# Patient Record
Sex: FEMALE | Race: WHITE | Employment: OTHER | URBAN - METROPOLITAN AREA
[De-identification: names, ages, dates, MRNs, and addresses within clinical notes are randomized per-mention and may not be internally consistent; named-entity substitution may affect disease eponyms.]

---

## 2017-02-02 ENCOUNTER — HOSPITAL ENCOUNTER (OUTPATIENT)
Dept: MAMMOGRAPHY | Age: 72
Discharge: HOME | End: 2017-02-02
Attending: FAMILY MEDICINE | Admitting: FAMILY MEDICINE

## 2017-02-02 DIAGNOSIS — Z13.820 SCREENING FOR OSTEOPOROSIS: ICD-10-CM

## 2017-02-02 DIAGNOSIS — Z12.31 ENCOUNTER FOR SCREENING MAMMOGRAM FOR BREAST CANCER: ICD-10-CM

## 2018-04-16 ENCOUNTER — HOSPITAL ENCOUNTER (OUTPATIENT)
Dept: MAMMOGRAPHY | Age: 73
Discharge: HOME | End: 2018-04-16
Attending: FAMILY MEDICINE | Admitting: FAMILY MEDICINE

## 2018-04-16 DIAGNOSIS — Z12.31 ENCOUNTER FOR SCREENING MAMMOGRAM FOR BREAST CANCER: ICD-10-CM

## 2019-01-10 ENCOUNTER — HOSPITAL ENCOUNTER (OUTPATIENT)
Dept: PULMONOLOGY | Age: 74
Discharge: HOME | End: 2019-01-10
Payer: MEDICARE

## 2019-01-10 ENCOUNTER — HOSPITAL ENCOUNTER (OUTPATIENT)
Dept: ULTRASOUND IMAGING | Age: 74
Discharge: HOME | End: 2019-01-10
Payer: MEDICARE

## 2019-01-10 VITALS — OXYGEN SATURATION: 96 %

## 2019-01-10 DIAGNOSIS — R10.11 ABDOMINAL PAIN, RIGHT UPPER QUADRANT: ICD-10-CM

## 2019-01-10 PROCEDURE — 94760 N-INVAS EAR/PLS OXIMETRY 1: CPT

## 2019-01-10 PROCEDURE — 94726 PLETHYSMOGRAPHY LUNG VOLUMES: CPT

## 2019-01-10 PROCEDURE — 94664 DEMO&/EVAL PT USE INHALER: CPT

## 2019-01-10 PROCEDURE — 6360000002 HC RX W HCPCS: Performed by: FAMILY MEDICINE

## 2019-01-10 PROCEDURE — 76700 US EXAM ABDOM COMPLETE: CPT

## 2019-01-10 PROCEDURE — 94060 EVALUATION OF WHEEZING: CPT

## 2019-01-10 PROCEDURE — 94729 DIFFUSING CAPACITY: CPT

## 2019-01-10 PROCEDURE — 94640 AIRWAY INHALATION TREATMENT: CPT

## 2019-01-10 RX ORDER — ALBUTEROL SULFATE 2.5 MG/3ML
2.5 SOLUTION RESPIRATORY (INHALATION) ONCE
Status: COMPLETED | OUTPATIENT
Start: 2019-01-10 | End: 2019-01-10

## 2019-01-10 RX ADMIN — ALBUTEROL SULFATE 2.5 MG: 2.5 SOLUTION RESPIRATORY (INHALATION) at 08:39

## 2019-04-19 ENCOUNTER — HOSPITAL ENCOUNTER (OUTPATIENT)
Dept: WOMENS IMAGING | Age: 74
Discharge: HOME | End: 2019-04-19
Payer: MEDICARE

## 2019-04-19 DIAGNOSIS — Z12.31 ENCOUNTER FOR SCREENING MAMMOGRAM FOR MALIGNANT NEOPLASM OF BREAST: ICD-10-CM

## 2019-04-19 PROCEDURE — 77063 BREAST TOMOSYNTHESIS BI: CPT

## 2019-09-18 ENCOUNTER — OFFICE VISIT (OUTPATIENT)
Dept: ORTHOPEDIC SURGERY | Age: 74
End: 2019-09-18
Payer: MEDICARE

## 2019-09-18 VITALS — WEIGHT: 165 LBS | HEIGHT: 63 IN | BODY MASS INDEX: 29.23 KG/M2

## 2019-09-18 DIAGNOSIS — M25.562 LEFT KNEE PAIN, UNSPECIFIED CHRONICITY: Primary | ICD-10-CM

## 2019-09-18 PROCEDURE — 99214 OFFICE O/P EST MOD 30 MIN: CPT | Performed by: ORTHOPAEDIC SURGERY

## 2019-09-19 NOTE — PROGRESS NOTES
person. The patient's mood and affect are appropriate. Lymphatic: The lymphatic examination bilaterally reveals all areas to be without enlargement or induration. Vascular: Examination reveals no swelling or calf tenderness. Peripheral pulses are palpable and 2+. Neurological: The patient has good coordination. There is no weakness or sensory deficit. Left Knee Examination  Inspection:   Knee alignment: neutral  no swelling noted. No erythema or ecchymosis. Skin is intact with no cellulitis, rashes, ulcerations, lymphedema or cutaneous lesions noted. Gait: normal. The patient can bear weight on the extremity. Palpation: mild tenderness to palpation on the patellar tendon. no effusion noted. Range of Motion:  full    Special Tests:  Lachman test: negative       Anterior drawer: negative       Posterior drawer: negative       Tammie's test: negative       Varus laxity at 30 degrees: negative       Valgus laxity at 30 degrees: negative    Strength: No gross motor weakness noted. All muscle groups appear to be functioning. Motor exam of the lower extremities show quadriceps, hamstrings, foot dorsiflexion and plantarflexion grossly intact. Neurologic & vascular: Sensation to both feet is grossly intact to light touch. The bilateral lower extremities are warm and well-perfused with brisk capillary refill. Additional Examinations:  Right Lower Extremity: Examination of the right lower extremity does not show any tenderness, deformity or injury. Range of motion is unremarkable. There is no gross instability. There are no rashes, ulcerations or lesions. Strength and tone are normal.      DIAGNOSTICS  Xrays obtained in office today:  Yes  Xrays reviewed today: Yes  Four views of the left knee   Fracture: No  Dislocation: No  Knee joint arthritis: mild  Medial compartment: mild  Lateral compartment: mild  Patellofemoral compartment: mild  Patellar tilt: No  Varus deformity: No  Valgus instructed to contact the office between now and schedule appointment if she has concerns related to her condition or if she needs assistance in scheduling any above tests. She is welcome to call for an appointment sooner if she has any additional concerns or questions. This dictation was performed with a verbal recognition program (DRAGON) and it was checked for errors. It is possible that there are still dictated errors within this office note. If so, please bring any errors to my attention for an addendum. All efforts were made to ensure that this office note is accurate.

## 2020-05-11 ENCOUNTER — HOSPITAL ENCOUNTER (OUTPATIENT)
Dept: WOMENS IMAGING | Age: 75
Discharge: HOME | End: 2020-05-11
Payer: MEDICARE

## 2020-05-11 PROCEDURE — 77063 BREAST TOMOSYNTHESIS BI: CPT

## 2021-02-21 ENCOUNTER — APPOINTMENT (OUTPATIENT)
Dept: GENERAL RADIOLOGY | Age: 76
End: 2021-02-21
Payer: MEDICARE

## 2021-02-21 ENCOUNTER — HOSPITAL ENCOUNTER (EMERGENCY)
Age: 76
Discharge: ANOTHER HOSPITAL | End: 2021-02-21
Attending: STUDENT IN AN ORGANIZED HEALTH CARE EDUCATION/TRAINING PROGRAM
Payer: MEDICARE

## 2021-02-21 ENCOUNTER — HOSPITAL ENCOUNTER (OUTPATIENT)
Age: 76
Setting detail: OBSERVATION
Discharge: HOME | End: 2021-02-22
Attending: INTERNAL MEDICINE | Admitting: INTERNAL MEDICINE
Payer: MEDICARE

## 2021-02-21 ENCOUNTER — APPOINTMENT (OUTPATIENT)
Dept: CT IMAGING | Age: 76
End: 2021-02-21
Payer: MEDICARE

## 2021-02-21 VITALS
WEIGHT: 175 LBS | DIASTOLIC BLOOD PRESSURE: 70 MMHG | RESPIRATION RATE: 19 BRPM | HEIGHT: 63 IN | SYSTOLIC BLOOD PRESSURE: 141 MMHG | OXYGEN SATURATION: 97 % | HEART RATE: 60 BPM | BODY MASS INDEX: 31.01 KG/M2 | TEMPERATURE: 98.6 F

## 2021-02-21 DIAGNOSIS — I27.20 PULMONARY HTN (HCC): ICD-10-CM

## 2021-02-21 DIAGNOSIS — R91.1 PULMONARY NODULE: ICD-10-CM

## 2021-02-21 DIAGNOSIS — E04.1 THYROID NODULE: ICD-10-CM

## 2021-02-21 DIAGNOSIS — H53.2 DIPLOPIA: Primary | ICD-10-CM

## 2021-02-21 LAB
A/G RATIO: 1.4 (ref 1.1–2.2)
ALBUMIN SERPL-MCNC: 4.5 G/DL (ref 3.4–5)
ALP BLD-CCNC: 118 U/L (ref 40–129)
ALT SERPL-CCNC: 34 U/L (ref 10–40)
ANION GAP SERPL CALCULATED.3IONS-SCNC: 8 MMOL/L (ref 3–16)
AST SERPL-CCNC: 27 U/L (ref 15–37)
BASOPHILS ABSOLUTE: 0.1 K/UL (ref 0–0.2)
BASOPHILS RELATIVE PERCENT: 0.5 %
BILIRUB SERPL-MCNC: 0.4 MG/DL (ref 0–1)
BUN BLDV-MCNC: 21 MG/DL (ref 7–20)
CALCIUM SERPL-MCNC: 10 MG/DL (ref 8.3–10.6)
CHLORIDE BLD-SCNC: 101 MMOL/L (ref 99–110)
CHP ED QC CHECK: NORMAL
CO2: 31 MMOL/L (ref 21–32)
CREAT SERPL-MCNC: 1 MG/DL (ref 0.6–1.2)
EOSINOPHILS ABSOLUTE: 0.3 K/UL (ref 0–0.6)
EOSINOPHILS RELATIVE PERCENT: 2.6 %
GFR AFRICAN AMERICAN: >60
GFR NON-AFRICAN AMERICAN: 54
GLOBULIN: 3.2 G/DL
GLUCOSE BLD-MCNC: 81 MG/DL (ref 70–99)
GLUCOSE BLD-MCNC: 86 MG/DL
GLUCOSE BLD-MCNC: 86 MG/DL (ref 70–99)
HCT VFR BLD CALC: 40.6 % (ref 36–48)
HEMOGLOBIN: 14.1 G/DL (ref 12–16)
INR BLD: 1.03 (ref 0.86–1.14)
LYMPHOCYTES ABSOLUTE: 3.2 K/UL (ref 1–5.1)
LYMPHOCYTES RELATIVE PERCENT: 32.6 %
MCH RBC QN AUTO: 29.6 PG (ref 26–34)
MCHC RBC AUTO-ENTMCNC: 34.7 G/DL (ref 31–36)
MCV RBC AUTO: 85.4 FL (ref 80–100)
MONOCYTES ABSOLUTE: 1 K/UL (ref 0–1.3)
MONOCYTES RELATIVE PERCENT: 10.7 %
NEUTROPHILS ABSOLUTE: 5.2 K/UL (ref 1.7–7.7)
NEUTROPHILS RELATIVE PERCENT: 53.6 %
PDW BLD-RTO: 12.9 % (ref 12.4–15.4)
PERFORMED ON: NORMAL
PLATELET # BLD: 213 K/UL (ref 135–450)
PMV BLD AUTO: 8.4 FL (ref 5–10.5)
POTASSIUM REFLEX MAGNESIUM: 3.7 MMOL/L (ref 3.5–5.1)
PROTHROMBIN TIME: 11.9 SEC (ref 10–13.2)
RBC # BLD: 4.76 M/UL (ref 4–5.2)
SODIUM BLD-SCNC: 140 MMOL/L (ref 136–145)
TOTAL PROTEIN: 7.7 G/DL (ref 6.4–8.2)
TROPONIN: <0.01 NG/ML
WBC # BLD: 9.7 K/UL (ref 4–11)

## 2021-02-21 PROCEDURE — 6370000000 HC RX 637 (ALT 250 FOR IP): Performed by: STUDENT IN AN ORGANIZED HEALTH CARE EDUCATION/TRAINING PROGRAM

## 2021-02-21 PROCEDURE — 70450 CT HEAD/BRAIN W/O DYE: CPT

## 2021-02-21 PROCEDURE — 71045 X-RAY EXAM CHEST 1 VIEW: CPT

## 2021-02-21 PROCEDURE — 99284 EMERGENCY DEPT VISIT MOD MDM: CPT

## 2021-02-21 PROCEDURE — G0379 DIRECT REFER HOSPITAL OBSERV: HCPCS

## 2021-02-21 PROCEDURE — 80053 COMPREHEN METABOLIC PANEL: CPT

## 2021-02-21 PROCEDURE — 99285 EMERGENCY DEPT VISIT HI MDM: CPT

## 2021-02-21 PROCEDURE — 85610 PROTHROMBIN TIME: CPT

## 2021-02-21 PROCEDURE — 93005 ELECTROCARDIOGRAM TRACING: CPT | Performed by: STUDENT IN AN ORGANIZED HEALTH CARE EDUCATION/TRAINING PROGRAM

## 2021-02-21 PROCEDURE — 70496 CT ANGIOGRAPHY HEAD: CPT

## 2021-02-21 PROCEDURE — 85025 COMPLETE CBC W/AUTO DIFF WBC: CPT

## 2021-02-21 PROCEDURE — G0378 HOSPITAL OBSERVATION PER HR: HCPCS

## 2021-02-21 PROCEDURE — 6360000004 HC RX CONTRAST MEDICATION: Performed by: STUDENT IN AN ORGANIZED HEALTH CARE EDUCATION/TRAINING PROGRAM

## 2021-02-21 PROCEDURE — 84484 ASSAY OF TROPONIN QUANT: CPT

## 2021-02-21 RX ORDER — ASPIRIN 81 MG/1
81 TABLET ORAL DAILY
Status: DISCONTINUED | OUTPATIENT
Start: 2021-02-22 | End: 2021-02-22 | Stop reason: HOSPADM

## 2021-02-21 RX ORDER — ACETAMINOPHEN 325 MG/1
650 TABLET ORAL EVERY 4 HOURS PRN
Status: DISCONTINUED | OUTPATIENT
Start: 2021-02-21 | End: 2021-02-22 | Stop reason: HOSPADM

## 2021-02-21 RX ORDER — ATORVASTATIN CALCIUM 80 MG/1
80 TABLET, FILM COATED ORAL NIGHTLY
Status: DISCONTINUED | OUTPATIENT
Start: 2021-02-22 | End: 2021-02-22 | Stop reason: HOSPADM

## 2021-02-21 RX ORDER — ASPIRIN 300 MG/1
300 SUPPOSITORY RECTAL DAILY
Status: DISCONTINUED | OUTPATIENT
Start: 2021-02-22 | End: 2021-02-22 | Stop reason: HOSPADM

## 2021-02-21 RX ORDER — PANTOPRAZOLE SODIUM 40 MG/1
40 TABLET, DELAYED RELEASE ORAL
Status: DISCONTINUED | OUTPATIENT
Start: 2021-02-22 | End: 2021-02-22 | Stop reason: HOSPADM

## 2021-02-21 RX ORDER — SODIUM CHLORIDE 0.9 % (FLUSH) 0.9 %
10 SYRINGE (ML) INJECTION EVERY 12 HOURS SCHEDULED
Status: DISCONTINUED | OUTPATIENT
Start: 2021-02-22 | End: 2021-02-22 | Stop reason: HOSPADM

## 2021-02-21 RX ORDER — M-VIT,TX,IRON,MINS/CALC/FOLIC 27MG-0.4MG
1 TABLET ORAL DAILY
Status: DISCONTINUED | OUTPATIENT
Start: 2021-02-22 | End: 2021-02-22 | Stop reason: HOSPADM

## 2021-02-21 RX ORDER — SODIUM CHLORIDE 0.9 % (FLUSH) 0.9 %
10 SYRINGE (ML) INJECTION PRN
Status: DISCONTINUED | OUTPATIENT
Start: 2021-02-21 | End: 2021-02-22 | Stop reason: HOSPADM

## 2021-02-21 RX ORDER — PROMETHAZINE HYDROCHLORIDE 25 MG/1
12.5 TABLET ORAL EVERY 6 HOURS PRN
Status: DISCONTINUED | OUTPATIENT
Start: 2021-02-21 | End: 2021-02-22 | Stop reason: HOSPADM

## 2021-02-21 RX ORDER — ONDANSETRON 2 MG/ML
4 INJECTION INTRAMUSCULAR; INTRAVENOUS EVERY 6 HOURS PRN
Status: DISCONTINUED | OUTPATIENT
Start: 2021-02-21 | End: 2021-02-22 | Stop reason: HOSPADM

## 2021-02-21 RX ORDER — ASPIRIN 325 MG
325 TABLET ORAL ONCE
Status: COMPLETED | OUTPATIENT
Start: 2021-02-21 | End: 2021-02-21

## 2021-02-21 RX ORDER — LABETALOL HYDROCHLORIDE 5 MG/ML
10 INJECTION, SOLUTION INTRAVENOUS EVERY 10 MIN PRN
Status: DISCONTINUED | OUTPATIENT
Start: 2021-02-21 | End: 2021-02-22 | Stop reason: HOSPADM

## 2021-02-21 RX ADMIN — ASPIRIN 325 MG: 325 TABLET, FILM COATED ORAL at 20:05

## 2021-02-21 RX ADMIN — IOPAMIDOL 85 ML: 755 INJECTION, SOLUTION INTRAVENOUS at 17:18

## 2021-02-21 ASSESSMENT — VISUAL ACUITY: OD: 20/70

## 2021-02-21 NOTE — ED PROVIDER NOTES
[address] ED      CHIEF COMPLAINT  Diplopia (Double vision x3 days c/o headache)       HISTORY OF PRESENT ILLNESS  Trupti Barrios is a 66 y.o. female with a past medical history of hyperlipidemia and hypertension who presents to the ED complaining of neurologic deficits. A stoke code was called. Last known well: 11 PM yesterday patient states that she had symptoms intermittent for the past couple days constant since awakening this morning  Neurologic Deficits: Double vision    Patient reports diplopia over the past 3 days. Initially was intermittent but has been constant since awakening this morning. Last known well 11 PM yesterday. She reports that the double vision is only present when she is looking at objects far away. Patient talk to her ophthalmologist today who requested that she look at her pupil sizes and she states her pupils were largely unequal with the right greater than the left. Patient was sent in by ophthalmologist for stroke evaluation. Denies any numbness, weakness, tingling, slurred speech. She denies any head trauma or blood thinners. She denies chest pain or shortness of breath or other symptoms. Patient does not smoke. No other complaints, modifying factors or associated symptoms. I have reviewed the following from the nursing documentation.     Past Medical History:   Diagnosis Date    GERD (gastroesophageal reflux disease)     Hyperlipidemia     in past    Hypertension      Past Surgical History:   Procedure Laterality Date    ABDOMINOPLASTY      BLADDER REPAIR  1991    COLONOSCOPY  4889/1533    x2 polyps    HYSTERECTOMY  1984     Family History   Problem Relation Age of Onset    Asthma Mother     Cancer Mother         leukemia     Social History     Socioeconomic History    Marital status:      Spouse name: Not on file    Number of children: Not on file    Years of education: Not on file    Highest education level: Not on file Occupational History    Not on file   Social Needs    Financial resource strain: Not on file    Food insecurity     Worry: Not on file     Inability: Not on file    Transportation needs     Medical: Not on file     Non-medical: Not on file   Tobacco Use    Smoking status: Never Smoker    Smokeless tobacco: Never Used   Substance and Sexual Activity    Alcohol use: No    Drug use: No    Sexual activity: Not on file   Lifestyle    Physical activity     Days per week: Not on file     Minutes per session: Not on file    Stress: Not on file   Relationships    Social connections     Talks on phone: Not on file     Gets together: Not on file     Attends Buddhism service: Not on file     Active member of club or organization: Not on file     Attends meetings of clubs or organizations: Not on file     Relationship status: Not on file    Intimate partner violence     Fear of current or ex partner: Not on file     Emotionally abused: Not on file     Physically abused: Not on file     Forced sexual activity: Not on file   Other Topics Concern    Not on file   Social History Narrative    Not on file     No current facility-administered medications for this encounter. No current outpatient medications on file. No Known Allergies    REVIEW OF SYSTEMS  10 systems reviewed, pertinent positives per HPI otherwise noted to be negative. PHYSICAL EXAM  BP (!) 155/87   Pulse 83   Temp 98.6 °F (37 °C) (Oral)   Resp 20   Ht 5' 3\" (1.6 m)   Wt 175 lb (79.4 kg)   SpO2 97%   BMI 31.00 kg/m²    GENERAL APPEARANCE: Awake and alert. Cooperative. no distress. HENT: Normocephalic. Atraumatic. Mucous membranes are moist  NECK: Supple. Full range of motion of the neck without stiffness or pain. EYES: PERRL. EOM's intact. No evidence of foreign body. Fluorescein staining shows no abrasions or ulceration. Ocular pressure is: right- 18; left- 27. Visual acuity is: right- 20/70; left- 20/40.   Together-20/40 No evidence of periorbital cellulitis. No evidence of proptosis. HEART/CHEST: RRR. No murmurs. LUNGS: Respirations unlabored. CTAB. Good air exchange. Speaking comfortably in full sentences. ABDOMEN: No tenderness. Soft. Non-distended. No masses. No organomegaly. No guarding or rebound. MUSCULOSKELETAL: No extremity edema. Compartments soft. No deformity. No tenderness in the extremities. All extremities neurovascularly intact. SKIN: Warm and dry. No acute rashes. PSYCHIATRIC: Normal mood and affect. NEUROLOGICAL: Alert and oriented. CN's 2-12 intact. No gross facial drooping. Strength 5/5, sensation intact. NIHSS: 0      NIH Stroke Scale     Interval: Baseline  Time: 4:47 PM  Person Administering Scale: Zain Smith   Administer stroke scale items in the order listed. Record performance in each category after each subscale exam. Do not go back and change scores. Follow directions provided for each exam technique. Scores should reflect what the patient does, not what the clinician thinks the patient can do. The clinician should record answers while administering the exam and work quickly. Except where indicated, the patient should not be coached (i.e., repeated requests to patient to make a special effort). 1a  Level of consciousness: 0=alert; keenly responsive   1b. LOC questions:  0=Performs both tasks correctly   1c. LOC commands: 0=Performs both tasks correctly   2. Best Gaze: 0=normal   3. Visual: 0=No visual loss   4. Facial Palsy: 0=Normal symmetric movement   5a. Motor left arm: 0=No drift, limb holds 90 (or 45) degrees for full 10 seconds   5b. Motor right arm: 0=No drift, limb holds 90 (or 45) degrees for full 10 seconds   6a. motor left le=No drift, limb holds 90 (or 45) degrees for full 10 seconds   6b  Motor right le=No drift, limb holds 90 (or 45) degrees for full 10 seconds   7. Limb Ataxia: 0=Absent   8. Sensory: 0=Normal; no sensory loss   9.  Best Language:  0=No aphasia, Ref Range    Ventricular Rate 74 BPM    Atrial Rate 74 BPM    P-R Interval 144 ms    QRS Duration 70 ms    Q-T Interval 408 ms    QTc Calculation (Bazett) 452 ms    P Axis 5 degrees    R Axis -6 degrees    T Axis 30 degrees    Diagnosis       Normal sinus rhythmMinimal voltage criteria for LVH, may be normal variantBorderline ECGWhen compared with ECG of 20-JAN-2009 08:21,No significant change was found       ECG  The Ekg interpreted by me shows  normal sinus rhythm with a rate of 74  Axis is   Left axis deviation  QTc is  prolonged  Intervals and Durations are unremarkable. ST Segments: nonspecific changes  No previous for comparison. RADIOLOGY    XR CHEST PORTABLE   Final Result   Borderline cardiomegaly, without evidence of CHF. Changes are exaggerated by   the poor inspiratory effort. CTA HEAD NECK W CONTRAST   Final Result   1. No acute intracranial abnormality. 2. No acute abnormality or flow-limiting stenosis of the major arteries of   the head and neck. 3. Pulmonary hypertension. 4. 2 cm right thyroid nodule. Follow-up recommendation is listed below. 5. 4 mm lung nodule in the right upper lobe. Follow-up recommendation is   listed below. Results reported to Dr. Gianna Kingston at 5:20 p.m. on February 21, 2021. RECOMMENDATIONS:   Managing Incidental Thyroid Nodule Detected at CT or MRI or US1. Further evaluation by thyroid Ultrasound recommended for these incidental   nodules:      ~Age 18 years or less - Any nodule. ~Age 21-27 years old - Nodule 1 cm in size or greater. ~Age 35 years or more - Nodule 1.5 cm in size or greater. Follow up thyroid ultrasound also recommend in these scenarios:      ~Solitary nodule with high risk imaging features (locally invasive nodule   or suspicious lymph nodes). ~Any nodule in a heterogeneous enlarged thyroid gland.       NO further imaging is recommended in the following scenarios:      ~No f/u imaging is recommended for ITNs scenarios:      ~Solitary nodule with high risk imaging features (locally invasive nodule   or suspicious lymph nodes). ~Any nodule in a heterogeneous enlarged thyroid gland. NO further imaging is recommended in the following scenarios:      ~No f/u imaging is recommended for ITNs not meeting the above criteria.      ~No US or f/u recommended for ITNs without high risk features or with   limited life expectancy or significant co-morbidities, unless clinically   warranted. Note: These recommendations do not apply if increased risk for thyroid cancer   or symptomatic thyroid disease. Recommendations for f/u of Incidental Thyroid Nodules (ITN) found on CT, MR,   NM and Extrathyroidal US are based upon the ACR white paper and [facility] 3-tiered   system for managing ITNs:J Am Domenico Radiol. 2015 Feb;12(2): 143-50      Fleischner Society guidelines for follow-up and management of incidentally   detected pulmonary nodules:      Single Solid Nodule:      Nodule size less than 6 mm   In a low-risk patient, no routine follow-up. In a high-risk patient, optional CT at 12 months. - Low risk patients include individuals with minimal or absent history of   smoking and other known risk factors. - High risk patients include individuals with a history or smoking or known   risk factors. Radiology 2017 http://pubs. rsna.org/doi/full/10.1148/radiol. 7750286585                  ED COURSE / MDM  Patient seen and evaluated. Old records reviewed. Labs and imaging reviewed and results discussed with patient. Overall, well appearing patient in no acute distress, presenting for diplopia. Physical exam remarkable for reported diplopia, ocular pressure elevated in left eye.      Differential diagnosis includes but is not limited to: Subarachnoid hemorrhage, intracranial hemorrhage, CVA, TIA, malignancy, hypoglycemia, low suspicion for glaucoma    During the patient's ED course, the patient was given: Medications   iopamidol (ISOVUE-370) 76 % injection 85 mL (85 mLs Intravenous Given 2/21/21 1718)   aspirin tablet 325 mg (325 mg Oral Given 2/21/21 2005)        ED Course as of Feb 22 1423   Sun Feb 21, 2021   Spoke to [employer] stroke team given that last known well was 11 PM. They agreed the patient is not a candidate for TPA. They did agree with stroke work-up. [ER]   No hypoglycemia    [ER]   No leukocytosis, anemia, thrombocytopenia. [ER]   Mildly elevated BUN, no other significant kidney dysfunction or electrolyte abnormality. [ER]   Liver function testing unremarkable. [ER]   Coagulation studies unremarkable. [ER]   CT scan showed no evidence of intracranial hemorrhage or large vessel occlusion.    [ER]      ED Course User Index  [ER] Catalina Soto MD     Patient's ocular exam was remarkable for elevated ocular pressure in the left eye. Patient denies any pain. Lower suspicion for acute glaucoma given lack of pain. Suspect that abnormal ocular pressures due to patient intolerance of Cliff-Pen measurements. Patient also had some discontinuity of visual acuity between her eyes with the right worse than the left. I did discuss patient's ocular exam with patient's ophthalmologist including abnormal visual acuity as well as increased left ocular pressure. Dr. Julia Bradley did not believe that the patient needed to be seen by an ophthalmologist urgently and stated that patient could have stroke work-up completed and then follow-up at the [facility] on an outpatient basis. Patient's NIH stroke scale was 0. A stroke alert was called due to last known well 11 PM yesterday. Patient was not a candidate for TPA. CT imaging did not show acute bleeding or LVO. Patient was discussed with [facility] stroke team.  They did agree with plan to admit for MRI. Given aspirin.     She had multiple incidental findings on imaging including pulmonary nodule and thyroid nodule. Patient alerted to these findings and encouraged to follow-up on an outpatient basis for further testing as needed. She does not have a history of smoking does have a history of secondhand smoke exposure. CT scan also showed evidence of possible pulmonary hypertension, recommend further evaluation while inpatient. She was transferred to [facility] in stable condition. CLINICAL IMPRESSION  1. Diplopia    2. Pulmonary HTN (HCC)    3. Pulmonary nodule    4. Thyroid nodule        Blood pressure (!) 141/70, pulse 60, temperature 98.6 °F (37 °C), temperature source Oral, resp. rate 19, height 5' 3\" (1.6 m), weight 175 lb (79.4 kg), SpO2 97 %. DISPOSITION  [place] was transferred to [facility] in stable condition. DISCLAIMER: This chart was created using Dragon dictation software. Efforts were made by me to ensure accuracy, however some errors may be present due to limitations of this technology and occasionally words are not transcribed correctly.       Karen Alvarado MD  02/22/21 6349

## 2021-02-21 NOTE — ED NOTES
1841 - called [facility] in hopes to speak with Dr. Ashley Loaiza.    1844 - Dr. Ashley Loaiza called back with [facility]     Jaden Jaramillo  02/21/21 1845    Correction on name Dr. Loco Chavez  02/21/21 [phone]

## 2021-02-21 NOTE — ED NOTES
80 - Called   for stroke team  80 - Stroke phone called for CT Scan  1704 - Stroke team doctor called back.  (Dr. Srikanth Aldrich)      Donovan Miller  02/21/21 1710

## 2021-02-22 ENCOUNTER — APPOINTMENT (OUTPATIENT)
Dept: MRI IMAGING | Age: 76
End: 2021-02-22
Attending: INTERNAL MEDICINE
Payer: MEDICARE

## 2021-02-22 VITALS
SYSTOLIC BLOOD PRESSURE: 132 MMHG | DIASTOLIC BLOOD PRESSURE: 72 MMHG | HEART RATE: 57 BPM | HEIGHT: 63 IN | RESPIRATION RATE: 16 BRPM | TEMPERATURE: 98.1 F | WEIGHT: 175 LBS | BODY MASS INDEX: 31.01 KG/M2

## 2021-02-22 LAB
ANION GAP SERPL CALCULATED.3IONS-SCNC: 8 MMOL/L (ref 3–16)
BASOPHILS ABSOLUTE: 0.1 K/UL (ref 0–0.2)
BASOPHILS RELATIVE PERCENT: 0.8 %
BUN BLDV-MCNC: 23 MG/DL (ref 7–20)
CALCIUM SERPL-MCNC: 9.6 MG/DL (ref 8.3–10.6)
CHLORIDE BLD-SCNC: 99 MMOL/L (ref 99–110)
CHOLESTEROL, TOTAL: 128 MG/DL (ref 0–199)
CO2: 29 MMOL/L (ref 21–32)
CREAT SERPL-MCNC: 0.9 MG/DL (ref 0.6–1.2)
EKG ATRIAL RATE: 74 BPM
EKG DIAGNOSIS: NORMAL
EKG P AXIS: 5 DEGREES
EKG P-R INTERVAL: 144 MS
EKG Q-T INTERVAL: 408 MS
EKG QRS DURATION: 70 MS
EKG QTC CALCULATION (BAZETT): 452 MS
EKG R AXIS: -6 DEGREES
EKG T AXIS: 30 DEGREES
EKG VENTRICULAR RATE: 74 BPM
EOSINOPHILS ABSOLUTE: 0.2 K/UL (ref 0–0.6)
EOSINOPHILS RELATIVE PERCENT: 2.8 %
ESTIMATED AVERAGE GLUCOSE: 116.9 MG/DL
GFR AFRICAN AMERICAN: >60
GFR NON-AFRICAN AMERICAN: >60
GLUCOSE BLD-MCNC: 135 MG/DL (ref 70–99)
HBA1C MFR BLD: 5.7 %
HCT VFR BLD CALC: 37.4 % (ref 36–48)
HDLC SERPL-MCNC: 49 MG/DL (ref 40–60)
HEMOGLOBIN: 12.9 G/DL (ref 12–16)
LDL CHOLESTEROL CALCULATED: 67 MG/DL
LYMPHOCYTES ABSOLUTE: 2.7 K/UL (ref 1–5.1)
LYMPHOCYTES RELATIVE PERCENT: 32.5 %
MCH RBC QN AUTO: 29.5 PG (ref 26–34)
MCHC RBC AUTO-ENTMCNC: 34.6 G/DL (ref 31–36)
MCV RBC AUTO: 85.4 FL (ref 80–100)
MONOCYTES ABSOLUTE: 0.7 K/UL (ref 0–1.3)
MONOCYTES RELATIVE PERCENT: 8.5 %
NEUTROPHILS ABSOLUTE: 4.6 K/UL (ref 1.7–7.7)
NEUTROPHILS RELATIVE PERCENT: 55.4 %
PDW BLD-RTO: 12.9 % (ref 12.4–15.4)
PLATELET # BLD: 186 K/UL (ref 135–450)
PMV BLD AUTO: 8.4 FL (ref 5–10.5)
POTASSIUM REFLEX MAGNESIUM: 3.9 MMOL/L (ref 3.5–5.1)
RBC # BLD: 4.38 M/UL (ref 4–5.2)
SODIUM BLD-SCNC: 136 MMOL/L (ref 136–145)
TRIGL SERPL-MCNC: 59 MG/DL (ref 0–150)
TROPONIN: <0.01 NG/ML
TROPONIN: <0.01 NG/ML
TSH REFLEX: 0.84 UIU/ML (ref 0.27–4.2)
VLDLC SERPL CALC-MCNC: 12 MG/DL
WBC # BLD: 8.4 K/UL (ref 4–11)

## 2021-02-22 PROCEDURE — 84484 ASSAY OF TROPONIN QUANT: CPT

## 2021-02-22 PROCEDURE — 84443 ASSAY THYROID STIM HORMONE: CPT

## 2021-02-22 PROCEDURE — 96372 THER/PROPH/DIAG INJ SC/IM: CPT

## 2021-02-22 PROCEDURE — G0378 HOSPITAL OBSERVATION PER HR: HCPCS

## 2021-02-22 PROCEDURE — 93010 ELECTROCARDIOGRAM REPORT: CPT | Performed by: INTERNAL MEDICINE

## 2021-02-22 PROCEDURE — 2580000003 HC RX 258: Performed by: NURSE PRACTITIONER

## 2021-02-22 PROCEDURE — 83036 HEMOGLOBIN GLYCOSYLATED A1C: CPT

## 2021-02-22 PROCEDURE — 85025 COMPLETE CBC W/AUTO DIFF WBC: CPT

## 2021-02-22 PROCEDURE — 6370000000 HC RX 637 (ALT 250 FOR IP): Performed by: NURSE PRACTITIONER

## 2021-02-22 PROCEDURE — 99219 PR INITIAL OBSERVATION CARE/DAY 50 MINUTES: CPT | Performed by: PSYCHIATRY & NEUROLOGY

## 2021-02-22 PROCEDURE — 70551 MRI BRAIN STEM W/O DYE: CPT

## 2021-02-22 PROCEDURE — 80061 LIPID PANEL: CPT

## 2021-02-22 PROCEDURE — 6360000002 HC RX W HCPCS: Performed by: NURSE PRACTITIONER

## 2021-02-22 PROCEDURE — 80048 BASIC METABOLIC PNL TOTAL CA: CPT

## 2021-02-22 RX ORDER — ATORVASTATIN CALCIUM 40 MG/1
40 TABLET, FILM COATED ORAL NIGHTLY
Qty: 30 TABLET | Refills: 1 | Status: SHIPPED | OUTPATIENT
Start: 2021-02-22

## 2021-02-22 RX ORDER — ASPIRIN 81 MG/1
81 TABLET ORAL DAILY
Qty: 30 TABLET | Refills: 3 | Status: SHIPPED | OUTPATIENT
Start: 2021-02-23

## 2021-02-22 RX ADMIN — SODIUM CHLORIDE, PRESERVATIVE FREE 10 ML: 5 INJECTION INTRAVENOUS at 09:06

## 2021-02-22 RX ADMIN — ATORVASTATIN CALCIUM 80 MG: 80 TABLET, FILM COATED ORAL at 00:48

## 2021-02-22 RX ADMIN — ENOXAPARIN SODIUM 40 MG: 40 INJECTION SUBCUTANEOUS at 09:06

## 2021-02-22 RX ADMIN — PANTOPRAZOLE SODIUM 40 MG: 40 TABLET, DELAYED RELEASE ORAL at 05:51

## 2021-02-22 RX ADMIN — ASPIRIN 81 MG: 81 TABLET, COATED ORAL at 09:06

## 2021-02-22 RX ADMIN — Medication 1 TABLET: at 09:06

## 2021-02-22 ASSESSMENT — PAIN SCALES - GENERAL: PAINLEVEL_OUTOF10: 0

## 2021-02-22 NOTE — CARE COORDINATION
CM notes pt admitted in Observation status with Diplopia diagnosis. Met with pt at bedside. Pt independent in ADL's and drives. Plans to return back home. Lives in a ranch house one step entrance with spouse. Has PCP and insurance, no DCP needs identified at this time. Confirmed with Mian Capone RN pt is independent in her room. If any needs or concerns should arise please advise.  Raissa Hoang RN

## 2021-02-22 NOTE — H&P
Hospital Medicine History & Physical      PCP: Charlie Liu MD    Date of Admission: 2/21/2021    Date of Service: Pt seen/examined on 2/21/2021 and Admitted to observation with expected LOS less than two midnights due to medical therapy. Chief Complaint: Double vision    History Of Present Illness:      66 y.o. female, with PMH of HTN, HLD, GERD, and obesity, who was a direct admit to [facility] from [facility] ED with double vision. History was obtained from the patient and review of the EMR. The patient states that over the past 3 to 4 days, she has been experiencing significant double vision. States that it first started a few days ago while she was watching TV when she noted that many things on the television were either double or triple the normal.  She stated that this did resolve on its own. However, over the next couple of days this happened again multiple times. Earlier today, her  was driving her home after [employer] when she kept telling him that he was driving in the median though he repeatedly stated that he was in his own mary. She went home and decided to call her ophthalmologist for further recommendations. She has been following with an ophthalmologist at [facility] to be evaluated for glaucoma every 4 months or so for some time now. She noted that her left pupil looked very irregular and her right pupil was a slit almost like a cat eye. She was told to go into the ED for further evaluation by her ophthalmologist for possible stroke work-up. The patient states that this diplopia does not exist if she closes 1 eye or the other. Only happens when she has both eyes open. She denies any other deficits. No headache. No weakness. No numbness or tingling anywhere. No gait abnormalities. Patient had CT head which was unremarkable. CTA head and neck did not reveal any acute abnormality.   However it did show incidental finding of 2 cm right thyroid nodule as well as incidental finding of 4 mm lung nodule in the right upper lobe. I did discuss the results with the patient and advised her to follow-up with her PCP on this. She verbalized understanding. Past Medical History:          Diagnosis Date    GERD (gastroesophageal reflux disease)     Hyperlipidemia     in past    Hypertension        Past Surgical History:          Procedure Laterality Date    ABDOMINOPLASTY      BLADDER REPAIR  1991    COLONOSCOPY  8096/2314    x2 polyps    HYSTERECTOMY  1984       Medications Prior to Admission:      Prior to Admission medications    Medication Sig Start Date End Date Taking? Authorizing Provider   omeprazole (PRILOSEC) 20 MG capsule Take 20 mg by mouth daily. Yes Historical Provider, MD   amlodipine (NORVASC) 5 MG tablet Take 10 mg by mouth daily. Yes Historical Provider, MD   therapeutic multivitamin-minerals (THERAGRAN-M) tablet Take 1 tablet by mouth daily. Yes Historical Provider, MD       Allergies:  Patient has no known allergies. Social History:      The patient currently lives independently. TOBACCO:   reports that she has never smoked. She has never used smokeless tobacco.  ETOH:   reports no history of alcohol use. Family History:      Reviewed in detail. Positive as follows:        Problem Relation Age of Onset    Asthma Mother     Cancer Mother         leukemia       REVIEW OF SYSTEMS:   Pertinent positives as noted in the HPI. All other systems reviewed and negative. PHYSICAL EXAM PERFORMED:    BP (!) 156/87   Pulse 70   Temp 98.7 °F (37.1 °C) (Oral)   Resp 16   Ht 5' 3\" (1.6 m)   Wt 175 lb (79.4 kg)   BMI 31.00 kg/m²     General appearance: Pleasant female in no apparent distress, appears stated age and cooperative. HEENT:  Normal cephalic, atraumatic without obvious deformity. Pupils equal, round, and reactive to light. Extra ocular muscles intact. Conjunctivae/corneas clear. Neck: Supple, with full range of motion.  No jugular venous distention. Trachea midline. Respiratory:  Normal respiratory effort. Clear to auscultation, bilaterally without Rales/Wheezes/Rhonchi. Cardiovascular:  Regular rate and rhythm with normal S1/S2 without murmurs, rubs or gallops. Abdomen: Soft, non-tender, non-distended with normal bowel sounds. Musculoskeletal:  No clubbing, cyanosis or edema bilaterally. Full range of motion without deformity. Skin: Skin color, texture, turgor normal.  No rashes or lesions. Neurologic:  Neurovascularly intact without any focal sensory/motor deficits. Cranial nerves: II-XII intact, grossly non-focal.  Psychiatric:  Alert and oriented, thought content appropriate, normal insight  Capillary Refill: Brisk,< 3 seconds   Peripheral Pulses: +2 palpable, equal bilaterally       Labs:     Recent Labs     02/21/21  1648 02/22/21  0320   WBC 9.7 8.4   HGB 14.1 12.9   HCT 40.6 37.4    186     Recent Labs     02/21/21  1648 02/22/21  0320    136   K 3.7 3.9    99   CO2 31 29   BUN 21* 23*   CREATININE 1.0 0.9   CALCIUM 10.0 9.6     Recent Labs     02/21/21  1648   AST 27   ALT 34   BILITOT 0.4   ALKPHOS 118     Recent Labs     02/21/21  1648   INR 1.03     Recent Labs     02/21/21  1648 02/22/21  0019 02/22/21  0320   TROPONINI <0.01 <0.01 <0.01       Urinalysis:    No results found for: Dearl [place], BACTERIA, RBCUA, BLOODU, [address], [address]    Radiology:     CXR: I have reviewed the CXR with the following interpretation: No acute cardiopulmonary findings, borderline cardiomegaly. EKG:  I have reviewed the EKG with the following interpretation: NSR, rate of 74    MRI brain without contrast    (Results Pending)       ASSESSMENT:    Active Hospital Problems    Diagnosis Date Noted    Diplopia [H53.2] 02/21/2021         PLAN:    Diplopia, unclear etiology. TIA vs CVA rule out  - [facility] in ED non-acute. Received 324 mg ASA  - CTA head/neck unremarkable  - Continue home Plavix, ASA and atorvastatin.   - Allow for permissive HTN in the setting of possible CVA. Hold home norvasc. PRN labetalol for SBP > 220.  - MRI brain without contrast pending  - Monitor on tele. - Consult neurology for further recs - appreciated in advance.  - PT/OT not ordered as pt has no deficits    Essential HTN, controlled. - On norvasc - held for permissive htn  - Telemetry monitoring    GERD - w/out active signs/sxs of dysphagia/odynophagia. No evidence of active PUD or hx of GI bleed. Controlled on home PPI - continue    Obesity -  With Body mass index is 31 kg/m². Complicating assessment and treatment. Placing patient at risk for multiple co-morbidities as well as early death and contributing to the patient's presentation. Counseled on weight loss    Incidental finding of 2 cm right thyroid nodule. Patient verbalizes significant history of thyroid issues in her family.  - Recommended outpatient thyroid ultrasound and follow-up with her PCP for this    Incidental finding of 4 mm lung nodule in the right upper lobe. Patient denies smoking, but states that her  smokes regularly and she is around him most of the time.  - Recommended following up with her PCP        DVT Prophylaxis: lovenox  Diet: Diet NPO Effective Now  Code Status: Full Code    PT/OT Eval Status: not ordered    Dispo - pending mri       Elizabeth Moreno - NP    Thank you Jomar Rodriguez MD for the opportunity to be involved in this patient's care.  If you have any questions or concerns please feel free to contact me at [phone].  -------------------------Dr. Candi Quesada------------------------

## 2021-02-22 NOTE — ED NOTES
Report given to transport [facility]. Report called to [address] [facility] at [facility].       Marianela Lovell RN  02/21/21 1054

## 2021-02-22 NOTE — ED NOTES
2028 Patient Bed Assignment           # [id number]           Report# [id number]           [employer] [facility]TA 2130     [employer]  02/21/21 2028       [employer]  02/21/21 2032

## 2021-02-22 NOTE — PROGRESS NOTES
Admitted to room 550 via stretcher from [facility]. Alert and oriented, states still has double vision at times. Denies any pain or nausea. Call light in reach and able to demonstrate use.
intact, grossly non-focal.  Psychiatric: Alert and oriented, thought content appropriate, normal insight  Capillary Refill: Brisk,< 3 seconds   Peripheral Pulses: +2 palpable, equal bilaterally       Labs:   Recent Labs     02/21/21  1648 02/22/21  0320   WBC 9.7 8.4   HGB 14.1 12.9   HCT 40.6 37.4    186     Recent Labs     02/21/21  1648 02/22/21  0320    136   K 3.7 3.9    99   CO2 31 29   BUN 21* 23*   CREATININE 1.0 0.9   CALCIUM 10.0 9.6     Recent Labs     02/21/21  1648   AST 27   ALT 34   BILITOT 0.4   ALKPHOS 118     Recent Labs     02/21/21  1648   INR 1.03     Recent Labs     02/21/21  1648 02/22/21  0019 02/22/21  0320   TROPONINI <0.01 <0.01 <0.01       Urinalysis:    No results found for: Sherlie Arjun, BACTERIA, RBCUA, BLOODU, [address], [address]    Radiology:  MRI brain without contrast   Final Result   1. No acute intracranial abnormality. No acute infarct. 2. Mild chronic microvascular ischemic changes. Assessment/Plan:    Active Hospital Problems    Diagnosis    Diplopia [H53.2]     PLAN:      Diplopia, unclear reason  Neuroimaging unremarkable  Awaiting neurologist opinion. Symptoms are persistent. Hypertension  Controlled blood pressure. Continue same management    GERD  Continue PPI    Thyroid nodule  Incidental finding. Will require outpatient follow-up. Incidental finding of lung nodule  4 mm. Patient is non-smoker, exposed to secondhand smoking. Will require outpatient follow-up with PCP. Discussed with the patient and , questions answered    DVT Prophylaxis: Lovenox  Diet: DIET GENERAL;  Code Status: Full Code    PT/OT Eval Status: Independent and ambulatory patient. Does not require PT OT. Dispo -awaiting neurology opinion, later today or tomorrow.     Meggan Reynolds MD

## 2021-02-22 NOTE — CONSULTS
In patient Neurology consult        [facility]      MD Nusrat Navarrete  1945    Date of Service: 2/22/2021    Referring Physician: Esmer Louis MD      Reason for the consult and CC: New onset double vision. HPI:   The patient is a 66y.o.  years old female with history of hypertension and hyperlipidemia who was admitted to the hospital yesterday with new onset double vision. Symptoms started 3 days ago. She describes painless binocular double vision for the last few days. Degree was moderate and duration was persistent with waxing and waning features. Better with holding in the eye. Mainly [id number] double pictures whenever she looks to the right and left hip. No headache, weakness or numbness or tingling. No other relieving or aggravating factors. No triggers. She called her ophthalmologist who recommended coming to the ED for possible stroke. Initial work-up in the ED was unremarkable CT head and CTA. She was admitted. Today she is about the same. Slight improvement but no new symptoms. MRI showed no acute stroke. No dysphagia or dysarthria or chest pain. Other review of system was unremarkable.       Family History   Problem Relation Age of Onset   [place] Asthma Mother     Cancer Mother         leukemia     Past Surgical History:   Procedure Laterality Date    ABDOMINOPLASTY      BLADDER REPAIR  1991    COLONOSCOPY  [id number]/4643    x2 polyps    HYSTERECTOMY  1984        Past Medical History:   Diagnosis Date    GERD (gastroesophageal reflux disease)     Hyperlipidemia     in past    Hypertension      Social History     Tobacco Use    Smoking status: Never Smoker    Smokeless tobacco: Never Used   Substance Use Topics    Alcohol use: No    Drug use: No     No Known Allergies  Current Facility-Administered Medications   Medication Dose Route Frequency Provider Last Rate Last Admin    pantoprazole (PROTONIX) tablet 40 mg  40 mg Oral NAI COSTELLO HUYEN Zamudio - NP   40 mg at 02/22/21 0551    therapeutic multivitamin-minerals 1 tablet  1 tablet Oral Daily HUYEN Nicholas NP   1 tablet at 02/22/21 0906    sodium chloride flush 0.9 % injection 10 mL  10 mL Intravenous 2 times per day HUYEN Nicholas - NP   10 mL at 02/22/21 0906    sodium chloride flush 0.9 % injection 10 mL  10 mL Intravenous PRN HUYEN Nicholas - DARRYL        promethazine (PHENERGAN) tablet 12.5 mg  12.5 mg Oral Q6H PRN HUYEN Nicholas - DARRYL        Or    ondansetron (ZOFRAN) injection 4 mg  4 mg Intravenous Q6H PRN HUYEN Nicholas - NP        magnesium hydroxide (MILK OF MAGNESIA) 400 MG/5ML suspension 30 mL  30 mL Oral Daily PRN HUYEN Nicholas - NP        enoxaparin (LOVENOX) injection 40 mg  40 mg Subcutaneous Daily HUYEN Nicholas - NP   40 mg at 02/22/21 7234    aspirin EC tablet 81 mg  81 mg Oral Daily HUYEN Nicholas - NP   81 mg at 02/22/21 3687    Or    aspirin suppository 300 mg  300 mg Rectal Daily HUYEN Nicholas - DARRYL        atorvastatin (LIPITOR) tablet 80 mg  80 mg Oral Nightly HUYEN Nicholas - NP   80 mg at 02/22/21 0048    labetalol (NORMODYNE;TRANDATE) injection 10 mg  10 mg Intravenous Q10 Min PRN HUYEN Nicholas - NP        acetaminophen (TYLENOL) tablet 650 mg  650 mg Oral Q4H PRN HUYEN Suresh NP           ROS : A 10-14 system review of constitutional, cardiovascular, respiratory, eyes, musculoskeletal, endocrine, GI, ENT, skin, hematological, genitourinary, psychiatric and neurologic systems was obtained and updated today and is unremarkable except as mentioned in my HPI      Exam:     Constitutional:   Vitals:    02/21/21 2220 02/21/21 2230 02/22/21 0430 02/22/21 0815   BP: (!) 156/87  110/65 132/72   Pulse: 70  62 57   Resp: 16  16 16   Temp: 98.7 °F (37.1 °C)  97.6 °F (36.4 °C) 98.1 °F (36.7 °C)   TempSrc: Oral  Oral Oral   Weight:  175 lb (79.4 kg)     Height:  5' 3\" (1.6 m)         General appearance: Normal development and appear in no acute distress. Eye:  Fundus: No blurring of optic disc. Neck: supple  Cardiovascular: No lower leg edema with good pulsation. No carotid bruit. No abnormal heart sounds  Mental Status:   Oriented to person, place, problem, and time. Memory: Good immediate recall. Intact remote memory  Normal attention span and concentration. Language: intact naming, repeating and fluency   Good fund of Knowledge. Aware of current events and vocabulary   Cranial Nerves:   II: Visual fields: Full to confrontation and nl [facility]. Pupils: equal, round, reactive to light  III,IV,VI: Extra Ocular Movements are intact. No nystagmus  V: Facial sensation is intact  VII: Facial strength and movements: intact and symmetric  VIII: Hearing: Intact to finger rub bilaterally  IX: Palate elevation is symmetric  XI: Shoulder shrug is intact  XII: Tongue movements are normal  Musculoskeletal: 5/5 in all 4 extremities. Tone: Normal tone. Reflexes: Bilateral biceps 2/4, triceps 2/4, brachial radialis 2/4, knee 2/4 and ankle 2/4. Planters: flexor bilaterally. Coordination: no pronator drift, no dysmetria with FNF in upper extremities. Normal REM. Sensation: normal to all modalities in both arms and legs. Gait/Posture: steady gait and normal posturing and station.      Data:  LABS:   Lab Results   Component Value Date     02/22/2021    K 3.9 02/22/2021    CL 99 02/22/2021    CO2 29 02/22/2021    BUN 23 02/22/2021    CREATININE 0.9 02/22/2021    GFRAA >60 02/22/2021    LABGLOM >60 02/22/2021    GLUCOSE 135 02/22/2021    CALCIUM 9.6 02/22/2021     Lab Results   Component Value Date    WBC 8.4 02/22/2021    RBC 4.38 02/22/2021    HGB 12.9 02/22/2021    HCT 37.4 02/22/2021    MCV 85.4 02/22/2021    RDW 12.9 02/22/2021     02/22/2021     Lab Results   Component Value Date    INR 1.03 02/21/2021    PROTIME 11.9 02/21/2021       Neuroimaging were independently reviewed by myself and discussed results with the patient and family  Reviewed notes from different physicians  Reviewed lab and blood testing    Impression:  New onset double vision. Exam today is nonfocal.  So far no clear etiology. Less likely new ischemic stroke based on examination. Less likely myasthenia with unremarkable neuro exam and absence of ophthalmoplegia. Rule out other metabolic or nutritional causes for double vision. Hypertension, controlled  Hyperlipidemia    Recommendation:  Aspirin  Statin  Discussed MRI results  Follow thyroid function testing  B12, folate and A1c  Follow-up with her ophthalmologist outpatient to rule out possibility of poor alignment   Monitor blood pressure at home  Continue home blood pressure medications  Driving precautions  PT and OT  Telemetry  DVT and GI prophylaxis  Can be discharged from neurology when medically stable  No further recommendation  MDM: Moderate      Thank you for referring such patient. If you have any questions regarding my consult note, please don't hesitate to call me. Iman Miller MD  [phone]    This dictation was generated by voice recognition computer software.  Although all attempts are made to edit the dictation for accuracy, there may be errors in the  transcription that are not intended

## 2021-02-22 NOTE — DISCHARGE SUMMARY
Hospital Medicine Discharge Summary    Patient ID: Bhargavi Day      Patient's PCP: Keith Cassidy MD    Admit Date: 2/21/2021     Discharge Date:   02/22/21     Admitting Physician: Natalee Camargo DO     Discharge Physician: Ran Hanks MD     Discharge Diagnoses: Active Hospital Problems    Diagnosis    HTN (hypertension), benign [I10]    Dyslipidemia [E78.5]    Diplopia [H53.2]       The patient was seen and examined on day of discharge and this discharge summary is in conjunction with any daily progress note from day of discharge. Hospital Course:    Patient was admitted with diplopia  Neuro-imaging was negative for acute CVA  TIA was considered. Patient was started on ASA & statin. Neurology evaluated the patient. Recommended ophthalmology evaluation as outpatient  Discharged home in stable condition          Physical Exam Performed:     /72   Pulse 57   Temp 98.1 °F (36.7 °C) (Oral)   Resp 16   Ht 5' 3\" (1.6 m)   Wt 175 lb (79.4 kg)   BMI 31.00 kg/m²       General appearance:  No apparent distress, appears stated age and cooperative. HEENT:  Normal cephalic, atraumatic without obvious deformity. Pupils equal, round, and reactive to light. Extra ocular muscles intact. Conjunctivae/corneas clear. Neck: Supple, with full range of motion. No jugular venous distention. Trachea midline. Respiratory:  Normal respiratory effort. Clear to auscultation, bilaterally without Rales/Wheezes/Rhonchi. Cardiovascular:  Regular rate and rhythm with normal S1/S2 without murmurs, rubs or gallops. Abdomen: Soft, non-tender, non-distended with normal bowel sounds. Musculoskeletal:  No clubbing, cyanosis or edema bilaterally. Full range of motion without deformity. Skin: Skin color, texture, turgor normal.  No rashes or lesions. Neurologic:  Neurovascularly intact without any focal sensory/motor deficits.  Cranial nerves: II-XII intact, grossly non-focal.  Psychiatric:  Alert and Patient's mental status improved in the ICU and is now on nasal cannula oxygen and BiPAP at night.  Medicine initially consulted 2/22 for assistance with management and noted patient to be markedly fluid overloaded, estimated to be approximately net positive 16 L with this admission.  Weight had increased from 71-75kg to 87kg.   She had elevated CVP and TTE was consistent volume overload as well as her BNP of > 4900.    IV diuresis with Lasix 40 bid  with improvement in urine output.    - 3/2 weight down to 73kg but pt still overloaded on exam with BNP of 2600; c/w IV lasix    oriented, thought content appropriate, normal insight  Capillary Refill: Brisk,< 3 seconds   Peripheral Pulses: +2 palpable, equal bilaterally       Labs: For convenience and continuity at follow-up the following most recent labs are provided:      CBC:    Lab Results   Component Value Date    WBC 8.4 02/22/2021    HGB 12.9 02/22/2021    HCT 37.4 02/22/2021     02/22/2021       Renal:    Lab Results   Component Value Date     02/22/2021    K 3.9 02/22/2021    CL 99 02/22/2021    CO2 29 02/22/2021    BUN 23 02/22/2021    CREATININE 0.9 02/22/2021    CALCIUM 9.6 02/22/2021         Significant Diagnostic Studies    Radiology:   MRI brain without contrast   Final Result   1. No acute intracranial abnormality. No acute infarct. 2. Mild chronic microvascular ischemic changes. Consults:     IP CONSULT TO NEUROLOGY    Disposition:  Home     Condition at Discharge: Stable    Discharge Instructions/Follow-up:  PCP, ophthalmology    Code Status:  Full Code     Activity: activity as tolerated    Diet: regular diet      Discharge Medications:     Current Discharge Medication List           Details   aspirin 81 MG EC tablet Take 1 tablet by mouth daily  Qty: 30 tablet, Refills: 3      atorvastatin (LIPITOR) 40 MG tablet Take 1 tablet by mouth nightly  Qty: 30 tablet, Refills: 1    Comments: Formulary substitution OK              Details   omeprazole (PRILOSEC) 20 MG capsule Take 20 mg by mouth daily. amlodipine (NORVASC) 5 MG tablet Take 10 mg by mouth daily. therapeutic multivitamin-minerals (THERAGRAN-M) tablet Take 1 tablet by mouth daily. Time Spent on discharge is more than 45 minutes in the examination, evaluation, counseling and review of medications and discharge plan. Signed:    Sterling Crowder MD   2/22/2021      Thank you Elliot Banks MD for the opportunity to be involved in this patient's care.  If you have any questions or concerns please feel free to contact me at [phone].

## 2021-07-14 ENCOUNTER — HOSPITAL ENCOUNTER (OUTPATIENT)
Dept: WOMENS IMAGING | Age: 76
Discharge: HOME | End: 2021-07-14
Payer: MEDICARE

## 2021-07-14 VITALS — WEIGHT: 170 LBS | BODY MASS INDEX: 30.12 KG/M2 | HEIGHT: 63 IN

## 2021-07-14 DIAGNOSIS — Z12.31 VISIT FOR SCREENING MAMMOGRAM: ICD-10-CM

## 2021-07-14 PROCEDURE — 77063 BREAST TOMOSYNTHESIS BI: CPT

## 2022-07-14 ENCOUNTER — HOSPITAL ENCOUNTER (OUTPATIENT)
Dept: WOMENS IMAGING | Age: 77
Discharge: HOME | End: 2022-07-14
Payer: MEDICARE

## 2022-07-14 VITALS — WEIGHT: 176 LBS | BODY MASS INDEX: 31.18 KG/M2 | HEIGHT: 63 IN

## 2022-07-14 DIAGNOSIS — Z12.31 VISIT FOR SCREENING MAMMOGRAM: ICD-10-CM

## 2022-07-14 PROCEDURE — 77063 BREAST TOMOSYNTHESIS BI: CPT

## 2023-09-06 ENCOUNTER — HOSPITAL ENCOUNTER (OUTPATIENT)
Dept: WOMENS IMAGING | Age: 78
Discharge: HOME | End: 2023-09-06
Payer: MEDICARE

## 2023-09-06 VITALS — HEIGHT: 63 IN | BODY MASS INDEX: 32.07 KG/M2 | WEIGHT: 181 LBS

## 2023-09-06 DIAGNOSIS — Z12.31 ENCOUNTER FOR SCREENING MAMMOGRAM FOR BREAST CANCER: ICD-10-CM

## 2023-09-06 PROCEDURE — 77063 BREAST TOMOSYNTHESIS BI: CPT

## 2025-03-28 ENCOUNTER — HOSPITAL ENCOUNTER (OUTPATIENT)
Dept: WOMENS IMAGING | Age: 80
Discharge: HOME | End: 2025-03-28
Payer: MEDICARE

## 2025-03-28 VITALS — BODY MASS INDEX: 31.71 KG/M2 | HEIGHT: 63 IN | WEIGHT: 179 LBS

## 2025-03-28 DIAGNOSIS — Z12.31 VISIT FOR SCREENING MAMMOGRAM: ICD-10-CM

## 2025-03-28 PROCEDURE — 77063 BREAST TOMOSYNTHESIS BI: CPT

## 2025-05-16 ENCOUNTER — APPOINTMENT (OUTPATIENT)
Dept: CT IMAGING | Age: 80
DRG: 310 | End: 2025-05-16
Payer: MEDICARE

## 2025-05-16 ENCOUNTER — HOSPITAL ENCOUNTER (INPATIENT)
Age: 80
LOS: 1 days | Discharge: HOME | DRG: 310 | End: 2025-05-18
Attending: STUDENT IN AN ORGANIZED HEALTH CARE EDUCATION/TRAINING PROGRAM | Admitting: STUDENT IN AN ORGANIZED HEALTH CARE EDUCATION/TRAINING PROGRAM
Payer: MEDICARE

## 2025-05-16 ENCOUNTER — APPOINTMENT (OUTPATIENT)
Dept: GENERAL RADIOLOGY | Age: 80
DRG: 310 | End: 2025-05-16
Payer: MEDICARE

## 2025-05-16 DIAGNOSIS — N17.9 AKI (ACUTE KIDNEY INJURY): ICD-10-CM

## 2025-05-16 DIAGNOSIS — R22.41 LOCALIZED SWELLING OF RIGHT LOWER EXTREMITY: ICD-10-CM

## 2025-05-16 DIAGNOSIS — R00.0 TACHYCARDIA: ICD-10-CM

## 2025-05-16 DIAGNOSIS — R06.00 DYSPNEA, UNSPECIFIED TYPE: Primary | ICD-10-CM

## 2025-05-16 DIAGNOSIS — R79.89 ELEVATED TROPONIN: ICD-10-CM

## 2025-05-16 LAB
ALBUMIN SERPL-MCNC: 4.1 G/DL (ref 3.4–5)
ALBUMIN/GLOB SERPL: 1.3 {RATIO} (ref 1.1–2.2)
ALP SERPL-CCNC: 120 U/L (ref 40–129)
ALT SERPL-CCNC: 23 U/L (ref 10–40)
ANION GAP SERPL CALCULATED.3IONS-SCNC: 13 MMOL/L (ref 3–16)
ANION GAP SERPL CALCULATED.3IONS-SCNC: 16 MMOL/L (ref 3–16)
AST SERPL-CCNC: 28 U/L (ref 15–37)
BASOPHILS # BLD: 0.1 K/UL (ref 0–0.2)
BASOPHILS NFR BLD: 0.5 %
BILIRUB SERPL-MCNC: 0.4 MG/DL (ref 0–1)
BUN SERPL-MCNC: 26 MG/DL (ref 7–20)
BUN SERPL-MCNC: 27 MG/DL (ref 7–20)
CALCIUM SERPL-MCNC: 8.5 MG/DL (ref 8.3–10.6)
CALCIUM SERPL-MCNC: 9.1 MG/DL (ref 8.3–10.6)
CHLORIDE SERPL-SCNC: 100 MMOL/L (ref 99–110)
CHLORIDE SERPL-SCNC: 94 MMOL/L (ref 99–110)
CO2 SERPL-SCNC: 22 MMOL/L (ref 21–32)
CO2 SERPL-SCNC: 23 MMOL/L (ref 21–32)
CREAT SERPL-MCNC: 1 MG/DL (ref 0.6–1.2)
CREAT SERPL-MCNC: 1.3 MG/DL (ref 0.6–1.2)
DEPRECATED RDW RBC AUTO: 12.9 % (ref 12.4–15.4)
EOSINOPHIL # BLD: 0 K/UL (ref 0–0.6)
EOSINOPHIL NFR BLD: 0.3 %
GFR SERPLBLD CREATININE-BSD FMLA CKD-EPI: 41 ML/MIN/{1.73_M2}
GFR SERPLBLD CREATININE-BSD FMLA CKD-EPI: 56 ML/MIN/{1.73_M2}
GLUCOSE SERPL-MCNC: 132 MG/DL (ref 70–99)
GLUCOSE SERPL-MCNC: 247 MG/DL (ref 70–99)
HCT VFR BLD AUTO: 43.4 % (ref 36–48)
HGB BLD-MCNC: 14.6 G/DL (ref 12–16)
LYMPHOCYTES # BLD: 1.8 K/UL (ref 1–5.1)
LYMPHOCYTES NFR BLD: 16.7 %
MAGNESIUM SERPL-MCNC: 2.04 MG/DL (ref 1.8–2.4)
MCH RBC QN AUTO: 28.9 PG (ref 26–34)
MCHC RBC AUTO-ENTMCNC: 33.6 G/DL (ref 31–36)
MCV RBC AUTO: 86 FL (ref 80–100)
MONOCYTES # BLD: 0.7 K/UL (ref 0–1.3)
MONOCYTES NFR BLD: 6.6 %
NEUTROPHILS # BLD: 8 K/UL (ref 1.7–7.7)
NEUTROPHILS NFR BLD: 75.9 %
PLATELET # BLD AUTO: 206 K/UL (ref 135–450)
PMV BLD AUTO: 8.7 FL (ref 5–10.5)
POTASSIUM SERPL-SCNC: 3.9 MMOL/L (ref 3.5–5.1)
POTASSIUM SERPL-SCNC: 4 MMOL/L (ref 3.5–5.1)
PROT SERPL-MCNC: 7.3 G/DL (ref 6.4–8.2)
RBC # BLD AUTO: 5.04 M/UL (ref 4–5.2)
SODIUM SERPL-SCNC: 132 MMOL/L (ref 136–145)
SODIUM SERPL-SCNC: 136 MMOL/L (ref 136–145)
TROPONIN, HIGH SENSITIVITY: 46 NG/L (ref 0–14)
TROPONIN, HIGH SENSITIVITY: 50 NG/L (ref 0–14)
WBC # BLD AUTO: 10.5 K/UL (ref 4–11)

## 2025-05-16 PROCEDURE — 83735 ASSAY OF MAGNESIUM: CPT

## 2025-05-16 PROCEDURE — 71260 CT THORAX DX C+: CPT

## 2025-05-16 PROCEDURE — 36415 COLL VENOUS BLD VENIPUNCTURE: CPT

## 2025-05-16 PROCEDURE — 71046 X-RAY EXAM CHEST 2 VIEWS: CPT

## 2025-05-16 PROCEDURE — 99285 EMERGENCY DEPT VISIT HI MDM: CPT

## 2025-05-16 PROCEDURE — 84484 ASSAY OF TROPONIN QUANT: CPT

## 2025-05-16 PROCEDURE — 6360000004 HC RX CONTRAST MEDICATION: Performed by: STUDENT IN AN ORGANIZED HEALTH CARE EDUCATION/TRAINING PROGRAM

## 2025-05-16 PROCEDURE — 84443 ASSAY THYROID STIM HORMONE: CPT

## 2025-05-16 PROCEDURE — 2580000003 HC RX 258: Performed by: STUDENT IN AN ORGANIZED HEALTH CARE EDUCATION/TRAINING PROGRAM

## 2025-05-16 PROCEDURE — 96361 HYDRATE IV INFUSION ADD-ON: CPT

## 2025-05-16 PROCEDURE — 96360 HYDRATION IV INFUSION INIT: CPT

## 2025-05-16 PROCEDURE — 93005 ELECTROCARDIOGRAM TRACING: CPT | Performed by: STUDENT IN AN ORGANIZED HEALTH CARE EDUCATION/TRAINING PROGRAM

## 2025-05-16 PROCEDURE — 85025 COMPLETE CBC W/AUTO DIFF WBC: CPT

## 2025-05-16 PROCEDURE — 80053 COMPREHEN METABOLIC PANEL: CPT

## 2025-05-16 RX ORDER — METOPROLOL TARTRATE 1 MG/ML
5 INJECTION, SOLUTION INTRAVENOUS ONCE
Status: DISCONTINUED | OUTPATIENT
Start: 2025-05-16 | End: 2025-05-16

## 2025-05-16 RX ORDER — ALBUTEROL SULFATE 90 UG/1
2 INHALANT RESPIRATORY (INHALATION) EVERY 6 HOURS PRN
COMMUNITY
Start: 2025-01-07

## 2025-05-16 RX ORDER — IOPAMIDOL 755 MG/ML
75 INJECTION, SOLUTION INTRAVASCULAR
Status: COMPLETED | OUTPATIENT
Start: 2025-05-16 | End: 2025-05-16

## 2025-05-16 RX ORDER — METOPROLOL SUCCINATE 50 MG/1
50 TABLET, EXTENDED RELEASE ORAL DAILY
COMMUNITY
Start: 2025-04-28

## 2025-05-16 RX ORDER — SODIUM CHLORIDE, SODIUM LACTATE, POTASSIUM CHLORIDE, CALCIUM CHLORIDE 600; 310; 30; 20 MG/100ML; MG/100ML; MG/100ML; MG/100ML
INJECTION, SOLUTION INTRAVENOUS CONTINUOUS
Status: DISCONTINUED | OUTPATIENT
Start: 2025-05-16 | End: 2025-05-17

## 2025-05-16 RX ORDER — SODIUM CHLORIDE, SODIUM LACTATE, POTASSIUM CHLORIDE, AND CALCIUM CHLORIDE .6; .31; .03; .02 G/100ML; G/100ML; G/100ML; G/100ML
1000 INJECTION, SOLUTION INTRAVENOUS ONCE
Status: COMPLETED | OUTPATIENT
Start: 2025-05-16 | End: 2025-05-16

## 2025-05-16 RX ORDER — TRIAMTERENE AND HYDROCHLOROTHIAZIDE 37.5; 25 MG/1; MG/1
1 CAPSULE ORAL DAILY
COMMUNITY

## 2025-05-16 RX ADMIN — SODIUM CHLORIDE, SODIUM LACTATE, POTASSIUM CHLORIDE, AND CALCIUM CHLORIDE: .6; .31; .03; .02 INJECTION, SOLUTION INTRAVENOUS at 23:50

## 2025-05-16 RX ADMIN — SODIUM CHLORIDE, SODIUM LACTATE, POTASSIUM CHLORIDE, AND CALCIUM CHLORIDE 1000 ML: .6; .31; .03; .02 INJECTION, SOLUTION INTRAVENOUS at 19:19

## 2025-05-16 RX ADMIN — IOPAMIDOL 75 ML: 755 INJECTION, SOLUTION INTRAVENOUS at 19:39

## 2025-05-16 ASSESSMENT — PAIN - FUNCTIONAL ASSESSMENT: PAIN_FUNCTIONAL_ASSESSMENT: NONE - DENIES PAIN

## 2025-05-16 NOTE — ED PROVIDER NOTES
[facility] EMERGENCY DEPARTMENT     EMERGENCY DEPARTMENT ENCOUNTER         Pt Name: Becky Garay   MRN: [id number]   Birthdate 1945   Date of evaluation: 5/16/2025   Provider: All Moore MD   PCP: Nicolas Denton MD   Note Started: 6:06 PM EDT 5/16/25       Chief Complaint     Shortness of Breath (X 2 days,) and Tachycardia      History of Present Illness     Becky Garay is a 80 y.o. female who presents with 2 days of shortness of breath and palpitations with rapid heart rate.  No history of similar no primary cardiopulmonary disease.  The patient has osteoarthritis and recently had a steroid injection of the left knee seem to be uncomplicated.  She remains ambulatory given her shortness of breath and tachycardia she presents for evaluation      Review of Systems     Positives and pertinent negatives as per HPI.    Past Medical, Surgical, Family, and Social History     She has a past medical history of GERD (gastroesophageal reflux disease), Hyperlipidemia, and Hypertension.  She has a past surgical history that includes Colonoscopy (1991/1996); Abdominoplasty; Hysterectomy (1984); bladder repair (1991); and Ovary removal.  Her family history includes Asthma in her mother; Breast Cancer (age of onset: 75) in her sister; Cancer in her mother.  She reports that she has never smoked. She has never used smokeless tobacco. She reports that she does not drink alcohol and does not use drugs.    SCREENINGS:          Teo Coma Scale  Eye Opening: Spontaneous  Best Verbal Response: Oriented  Best Motor Response: Obeys commands  Teo Coma Scale Score: 15                        CIWA Assessment  BP: 136/75  Pulse: (!) 125               Medications     Previous Medications    ALBUTEROL SULFATE HFA (PROVENTIL;VENTOLIN;PROAIR) 108 (90 BASE) MCG/ACT INHALER    Inhale 2 puffs into the lungs every 6 hours as needed    AMLODIPINE (NORVASC) 5 MG TABLET    Take 2 tablets by mouth daily    ASPIRIN 81 MG

## 2025-05-17 ENCOUNTER — ANCILLARY PROCEDURE (OUTPATIENT)
Dept: EMERGENCY DEPT | Age: 80
DRG: 310 | End: 2025-05-17
Attending: STUDENT IN AN ORGANIZED HEALTH CARE EDUCATION/TRAINING PROGRAM
Payer: MEDICARE

## 2025-05-17 PROBLEM — R00.0 TACHYCARDIA: Status: ACTIVE | Noted: 2025-05-17

## 2025-05-17 LAB
ANION GAP SERPL CALCULATED.3IONS-SCNC: 10 MMOL/L (ref 3–16)
BUN SERPL-MCNC: 19 MG/DL (ref 7–20)
CALCIUM SERPL-MCNC: 8.5 MG/DL (ref 8.3–10.6)
CHLORIDE SERPL-SCNC: 103 MMOL/L (ref 99–110)
CO2 SERPL-SCNC: 24 MMOL/L (ref 21–32)
CREAT SERPL-MCNC: 0.9 MG/DL (ref 0.6–1.2)
EKG ATRIAL RATE: 135 BPM
EKG DIAGNOSIS: NORMAL
EKG P AXIS: 1 DEGREES
EKG P-R INTERVAL: 138 MS
EKG Q-T INTERVAL: 296 MS
EKG QRS DURATION: 74 MS
EKG QTC CALCULATION (BAZETT): 444 MS
EKG R AXIS: -13 DEGREES
EKG T AXIS: 58 DEGREES
EKG VENTRICULAR RATE: 135 BPM
GFR SERPLBLD CREATININE-BSD FMLA CKD-EPI: 64 ML/MIN/{1.73_M2}
GLUCOSE SERPL-MCNC: 115 MG/DL (ref 70–99)
POTASSIUM SERPL-SCNC: 4 MMOL/L (ref 3.5–5.1)
SODIUM SERPL-SCNC: 137 MMOL/L (ref 136–145)
TSH SERPL DL<=0.005 MIU/L-ACNC: 1.98 UIU/ML (ref 0.27–4.2)

## 2025-05-17 PROCEDURE — 36415 COLL VENOUS BLD VENIPUNCTURE: CPT

## 2025-05-17 PROCEDURE — 2580000003 HC RX 258: Performed by: STUDENT IN AN ORGANIZED HEALTH CARE EDUCATION/TRAINING PROGRAM

## 2025-05-17 PROCEDURE — 6370000000 HC RX 637 (ALT 250 FOR IP): Performed by: INTERNAL MEDICINE

## 2025-05-17 PROCEDURE — 94761 N-INVAS EAR/PLS OXIMETRY MLT: CPT

## 2025-05-17 PROCEDURE — 93308 TTE F-UP OR LMTD: CPT

## 2025-05-17 PROCEDURE — 96361 HYDRATE IV INFUSION ADD-ON: CPT

## 2025-05-17 PROCEDURE — 80048 BASIC METABOLIC PNL TOTAL CA: CPT

## 2025-05-17 PROCEDURE — 2500000003 HC RX 250 WO HCPCS: Performed by: STUDENT IN AN ORGANIZED HEALTH CARE EDUCATION/TRAINING PROGRAM

## 2025-05-17 PROCEDURE — 6370000000 HC RX 637 (ALT 250 FOR IP): Performed by: STUDENT IN AN ORGANIZED HEALTH CARE EDUCATION/TRAINING PROGRAM

## 2025-05-17 PROCEDURE — 94640 AIRWAY INHALATION TREATMENT: CPT

## 2025-05-17 PROCEDURE — 1200000000 HC SEMI PRIVATE

## 2025-05-17 PROCEDURE — 93010 ELECTROCARDIOGRAM REPORT: CPT | Performed by: INTERNAL MEDICINE

## 2025-05-17 PROCEDURE — 99223 1ST HOSP IP/OBS HIGH 75: CPT | Performed by: INTERNAL MEDICINE

## 2025-05-17 PROCEDURE — 6360000002 HC RX W HCPCS: Performed by: STUDENT IN AN ORGANIZED HEALTH CARE EDUCATION/TRAINING PROGRAM

## 2025-05-17 RX ORDER — ENOXAPARIN SODIUM 100 MG/ML
40 INJECTION SUBCUTANEOUS DAILY
Status: DISCONTINUED | OUTPATIENT
Start: 2025-05-17 | End: 2025-05-18 | Stop reason: HOSPADM

## 2025-05-17 RX ORDER — PANTOPRAZOLE SODIUM 40 MG/1
40 TABLET, DELAYED RELEASE ORAL
Status: DISCONTINUED | OUTPATIENT
Start: 2025-05-17 | End: 2025-05-18 | Stop reason: HOSPADM

## 2025-05-17 RX ORDER — DULOXETIN HYDROCHLORIDE 60 MG/1
60 CAPSULE, DELAYED RELEASE ORAL DAILY
COMMUNITY

## 2025-05-17 RX ORDER — MAGNESIUM SULFATE IN WATER 40 MG/ML
2000 INJECTION, SOLUTION INTRAVENOUS PRN
Status: DISCONTINUED | OUTPATIENT
Start: 2025-05-17 | End: 2025-05-18 | Stop reason: HOSPADM

## 2025-05-17 RX ORDER — AMLODIPINE BESYLATE 5 MG/1
10 TABLET ORAL DAILY
Status: DISCONTINUED | OUTPATIENT
Start: 2025-05-17 | End: 2025-05-18

## 2025-05-17 RX ORDER — POTASSIUM CHLORIDE 1500 MG/1
40 TABLET, EXTENDED RELEASE ORAL PRN
Status: DISCONTINUED | OUTPATIENT
Start: 2025-05-17 | End: 2025-05-18 | Stop reason: HOSPADM

## 2025-05-17 RX ORDER — TRIAMTERENE AND HYDROCHLOROTHIAZIDE 37.5; 25 MG/1; MG/1
1 TABLET ORAL DAILY
Status: DISCONTINUED | OUTPATIENT
Start: 2025-05-17 | End: 2025-05-18 | Stop reason: HOSPADM

## 2025-05-17 RX ORDER — TERAZOSIN 2 MG/1
2 CAPSULE ORAL NIGHTLY
COMMUNITY

## 2025-05-17 RX ORDER — ASPIRIN 81 MG/1
81 TABLET ORAL DAILY
Status: DISCONTINUED | OUTPATIENT
Start: 2025-05-17 | End: 2025-05-18 | Stop reason: HOSPADM

## 2025-05-17 RX ORDER — ONDANSETRON 2 MG/ML
4 INJECTION INTRAMUSCULAR; INTRAVENOUS EVERY 6 HOURS PRN
Status: DISCONTINUED | OUTPATIENT
Start: 2025-05-17 | End: 2025-05-18 | Stop reason: HOSPADM

## 2025-05-17 RX ORDER — ALBUTEROL SULFATE 90 UG/1
2 INHALANT RESPIRATORY (INHALATION) 2 TIMES DAILY
Status: DISCONTINUED | OUTPATIENT
Start: 2025-05-17 | End: 2025-05-18 | Stop reason: HOSPADM

## 2025-05-17 RX ORDER — ALBUTEROL SULFATE 90 UG/1
2 INHALANT RESPIRATORY (INHALATION) EVERY 6 HOURS PRN
Status: DISCONTINUED | OUTPATIENT
Start: 2025-05-17 | End: 2025-05-18 | Stop reason: HOSPADM

## 2025-05-17 RX ORDER — SODIUM CHLORIDE 9 MG/ML
INJECTION, SOLUTION INTRAVENOUS PRN
Status: DISCONTINUED | OUTPATIENT
Start: 2025-05-17 | End: 2025-05-18 | Stop reason: HOSPADM

## 2025-05-17 RX ORDER — SODIUM CHLORIDE 0.9 % (FLUSH) 0.9 %
5-40 SYRINGE (ML) INJECTION EVERY 12 HOURS SCHEDULED
Status: DISCONTINUED | OUTPATIENT
Start: 2025-05-17 | End: 2025-05-18 | Stop reason: HOSPADM

## 2025-05-17 RX ORDER — ONDANSETRON 4 MG/1
4 TABLET, ORALLY DISINTEGRATING ORAL EVERY 8 HOURS PRN
Status: DISCONTINUED | OUTPATIENT
Start: 2025-05-17 | End: 2025-05-18 | Stop reason: HOSPADM

## 2025-05-17 RX ORDER — SODIUM CHLORIDE, SODIUM LACTATE, POTASSIUM CHLORIDE, CALCIUM CHLORIDE 600; 310; 30; 20 MG/100ML; MG/100ML; MG/100ML; MG/100ML
INJECTION, SOLUTION INTRAVENOUS CONTINUOUS
Status: DISCONTINUED | OUTPATIENT
Start: 2025-05-17 | End: 2025-05-17

## 2025-05-17 RX ORDER — POTASSIUM CHLORIDE 7.45 MG/ML
10 INJECTION INTRAVENOUS PRN
Status: DISCONTINUED | OUTPATIENT
Start: 2025-05-17 | End: 2025-05-18 | Stop reason: HOSPADM

## 2025-05-17 RX ORDER — METOPROLOL SUCCINATE 50 MG/1
50 TABLET, EXTENDED RELEASE ORAL DAILY
Status: DISCONTINUED | OUTPATIENT
Start: 2025-05-17 | End: 2025-05-18 | Stop reason: HOSPADM

## 2025-05-17 RX ORDER — ATORVASTATIN CALCIUM 10 MG/1
20 TABLET, FILM COATED ORAL NIGHTLY
Status: DISCONTINUED | OUTPATIENT
Start: 2025-05-17 | End: 2025-05-18 | Stop reason: HOSPADM

## 2025-05-17 RX ORDER — ACETAMINOPHEN 650 MG/1
650 SUPPOSITORY RECTAL EVERY 6 HOURS PRN
Status: DISCONTINUED | OUTPATIENT
Start: 2025-05-17 | End: 2025-05-18 | Stop reason: HOSPADM

## 2025-05-17 RX ORDER — ACETAMINOPHEN 325 MG/1
650 TABLET ORAL EVERY 6 HOURS PRN
Status: DISCONTINUED | OUTPATIENT
Start: 2025-05-17 | End: 2025-05-18 | Stop reason: HOSPADM

## 2025-05-17 RX ORDER — POLYETHYLENE GLYCOL 3350 17 G/17G
17 POWDER, FOR SOLUTION ORAL DAILY PRN
Status: DISCONTINUED | OUTPATIENT
Start: 2025-05-17 | End: 2025-05-18 | Stop reason: HOSPADM

## 2025-05-17 RX ORDER — SODIUM CHLORIDE 0.9 % (FLUSH) 0.9 %
5-40 SYRINGE (ML) INJECTION PRN
Status: DISCONTINUED | OUTPATIENT
Start: 2025-05-17 | End: 2025-05-18 | Stop reason: HOSPADM

## 2025-05-17 RX ORDER — DULOXETIN HYDROCHLORIDE 60 MG/1
60 CAPSULE, DELAYED RELEASE ORAL DAILY
Status: DISCONTINUED | OUTPATIENT
Start: 2025-05-17 | End: 2025-05-18 | Stop reason: HOSPADM

## 2025-05-17 RX ADMIN — ALBUTEROL SULFATE 2 PUFF: 90 AEROSOL, METERED RESPIRATORY (INHALATION) at 19:29

## 2025-05-17 RX ADMIN — SODIUM CHLORIDE, POTASSIUM CHLORIDE, SODIUM LACTATE AND CALCIUM CHLORIDE: 600; 310; 30; 20 INJECTION, SOLUTION INTRAVENOUS at 04:00

## 2025-05-17 RX ADMIN — METOPROLOL SUCCINATE 50 MG: 50 TABLET, EXTENDED RELEASE ORAL at 08:10

## 2025-05-17 RX ADMIN — ATORVASTATIN CALCIUM 20 MG: 10 TABLET, FILM COATED ORAL at 21:42

## 2025-05-17 RX ADMIN — ENOXAPARIN SODIUM 40 MG: 100 INJECTION SUBCUTANEOUS at 08:11

## 2025-05-17 RX ADMIN — PANTOPRAZOLE SODIUM 40 MG: 40 TABLET, DELAYED RELEASE ORAL at 08:10

## 2025-05-17 RX ADMIN — SODIUM CHLORIDE, PRESERVATIVE FREE 10 ML: 5 INJECTION INTRAVENOUS at 08:11

## 2025-05-17 RX ADMIN — SODIUM CHLORIDE, PRESERVATIVE FREE 10 ML: 5 INJECTION INTRAVENOUS at 21:30

## 2025-05-17 RX ADMIN — DULOXETINE 60 MG: 60 CAPSULE, DELAYED RELEASE ORAL at 11:27

## 2025-05-17 RX ADMIN — ASPIRIN 81 MG: 81 TABLET, COATED ORAL at 08:10

## 2025-05-17 RX ADMIN — TRIAMTERENE AND HYDROCHLOROTHIAZIDE 1 TABLET: 37.5; 25 TABLET ORAL at 08:10

## 2025-05-17 RX ADMIN — ALBUTEROL SULFATE 2 PUFF: 90 AEROSOL, METERED RESPIRATORY (INHALATION) at 08:34

## 2025-05-17 NOTE — CONSULTS
Cardiac Electrophysiology Consult Note      Physician requesting consult: Sofía Farley DO   Reason for Consult: tachycardia and possible HF  Admission Date: 5/16/2025  Room/Bed:  [facility]/0221-02    Assessment:     1. Palpitations/shortness of breath: Suspect that the patient's symptoms were precipitated by the abrupt discontinuation of beta-blocker after many years of use.  Discontinuation of such medication should be done gradually over time, if at all needed, to prevent rebound tachycardia which can precipitate some of the symptoms the patient is experiencing.    Troponin minimally elevated without upward trend  TSH normal  H/H OK  Creatinine 1.3 (baseline 0.9) on admission  Chest x-ray OK    She is now doing better in terms of heart rate controlled on telemetry after reinitiation of beta-blocker therapy.  Would maintain her on beta-blocker therapy and have her follow-up with her primary care physician to consider either keeping the beta-blocker or gradually weaning her off of it rather than abrupt discontinuation.  There is no current evidence of volume overload so I do not suspect that she has heart failure per se.    No further cardiac testing or treatment required at this time.    2. Hypertension: sub-optimal blood pressure control. Monitoring with the re-initiation of beta-blocker therapy.     3. Elevated BUN/Scr: subsequently improved/normalized, recommend maintenance of adequate hydration.    Plan:     1. Continue beta-blocker therapy  2. Follow up with primary physician following discharge  3. Cardiology will sign off; please call with any question    Subjective:     History of Present Illness:    Patient is a 80 y.o. female with past medical history significant for hypertension, obesity and dyslipidemia.  Denies any personal history of heart disease or heart procedures in the past.    She presented to ED yesterday complaining of shortness of breath and palpitations.     Patient states that her metoprolol  IntraVENous Q6H PRN All Saha MD        polyethylene glycol (GLYCOLAX) packet 17 g  17 g Oral Daily PRN All Saha MD        acetaminophen (TYLENOL) tablet 650 mg  650 mg Oral Q6H PRN All Saha MD        Or    acetaminophen (TYLENOL) suppository 650 mg  650 mg Rectal Q6H PRN All Saha MD        aspirin EC tablet 81 mg  81 mg Oral Daily All Saha MD   81 mg at 05/17/25 0810    [Held by provider] amLODIPine (NORVASC) tablet 10 mg  10 mg Oral Daily All Saha MD        atorvastatin (LIPITOR) tablet 20 mg  20 mg Oral Nightly All Saha MD        metoprolol succinate (TOPROL XL) extended release tablet 50 mg  50 mg Oral Daily All Saha MD   50 mg at 05/17/25 0810    pantoprazole (PROTONIX) tablet 40 mg  40 mg Oral QAM AC All Saha MD   40 mg at 05/17/25 0810    triamterene-hydroCHLOROthiazide (MAXZIDE-25) 37.5-25 MG per tablet 1 tablet  1 tablet Oral Daily All Saha MD   1 tablet at 05/17/25 0810    albuterol sulfate HFA (PROVENTIL;VENTOLIN;PROAIR) 108 (90 Base) MCG/ACT inhaler 2 puff  2 puff Inhalation Q6H PRN Michael Cueto MD   2 puff at 05/17/25 0834    albuterol sulfate HFA (PROVENTIL;VENTOLIN;PROAIR) 108 (90 Base) MCG/ACT inhaler 2 puff  2 puff Inhalation BID Sofía Farley DO        DULoxetine (CYMBALTA) extended release capsule 60 mg  60 mg Oral Daily Michael Cueto MD   60 mg at 05/17/25 1127         Coronary Calcium Score (2024)    OVERALL SCORES:   *  Agatston calcium score: 153   *  Total volume score: 136 mm3          ARTERY SCORES:   *  Left main coronary artery: 0   *  Right coronary artery: 1   *  Left anterior descending coronary artery: 152   *  Circumflex coronary artery: 0     ECG Interpretation:  (Date: 5/16/2025)  Rhythm: Sinus tachycardia  Rate: 135 BPM  PAC's / PVC's present: None  Conduction abnormalities: Normal AV conduction  Axis: normal          Echocardiogram: (2024, [facility])    Cardiac Anatomy     Left

## 2025-05-17 NOTE — FLOWSHEET NOTE
05/17/25 0736   Vital Signs   Temp 97.8 °F (36.6 °C)   Temp Source Oral   Pulse 88   Heart Rate Source Monitor   Respirations 18   BP (!) 157/91   MAP (Calculated) 113   BP Location Left upper arm   BP Method Automatic   Patient Position Semi fowlers   Pain Assessment   Pain Assessment None - Denies Pain   Oxygen Therapy   SpO2 95 %   O2 Device None (Room air)     AM assessment complete. Gave am meds due see mar. Stopped LR @ 75 per Dr Cueto r/t increase SOB/hx asthma. Respiratory consult. Tray table cleaned/setup for breakfast. Up as tolerated independent. Ice water given. Bed locked/lowest position. Call light within reach.

## 2025-05-17 NOTE — PROGRESS NOTES
Bedside report given and pt care transferred to Ivan JOSEPH. Pt denies needs at this time. Call light within reach.

## 2025-05-17 NOTE — PROGRESS NOTES
05/17/25 1900   RT Protocol   History Pulmonary Disease 2   Respiratory pattern 2   Breath sounds 2   Cough 0   Indications for Bronchodilator Therapy Decreased or absent breath sounds   Bronchodilator Assessment Score 6     RT Inhaler-Nebulizer Bronchodilator Protocol Note    There is a bronchodilator order in the chart from a provider indicating to follow the RT Bronchodilator Protocol and there is an “Initiate RT Inhaler-Nebulizer Bronchodilator Protocol” order as well (see protocol at bottom of note).    CXR Findings:  No results found.    The findings from the last RT Protocol Assessment were as follows:   History Pulmonary Disease: Chronic pulmonary disease  Respiratory Pattern: Dyspnea on exertion or RR 21-25 bpm  Breath Sounds: Slightly diminished and/or crackles  Cough: Strong, spontaneous, non-productive  Indication for Bronchodilator Therapy: Decreased or absent breath sounds  Bronchodilator Assessment Score: 6    Aerosolized bronchodilator medication orders have been revised according to the RT Inhaler-Nebulizer Bronchodilator Protocol below.    Respiratory Therapist to perform RT Therapy Protocol Assessment initially then follow the protocol.  Repeat RT Therapy Protocol Assessment PRN for score 0-3 or on second treatment, BID, and PRN for scores above 3.    No Indications - adjust the frequency to every 6 hours PRN wheezing or bronchospasm, if no treatments needed after 48 hours then discontinue using Per Protocol order mode.     If indication present, adjust the RT bronchodilator orders based on the Bronchodilator Assessment Score as indicated below.  Use Inhaler orders unless patient has one or more of the following: on home nebulizer, not able to hold breath for 10 seconds, is not alert and oriented, cannot activate and use MDI correctly, or respiratory rate 25 breaths per minute or more, then use the equivalent nebulizer order(s) with same Frequency and PRN reasons based on the score.  If a

## 2025-05-17 NOTE — PLAN OF CARE
Problem: Discharge Planning  Goal: Discharge to home or other facility with appropriate resources  5/17/2025 0821 by Ivan Pratt RN  Outcome: Progressing  5/17/2025 0744 by Marya Shea RN  Outcome: Progressing     Problem: Safety - Adult  Goal: Free from fall injury  5/17/2025 0821 by Ivan Pratt RN  Outcome: Progressing  5/17/2025 0744 by Marya Shea RN  Outcome: Progressing     Problem: Pain  Goal: Verbalizes/displays adequate comfort level or baseline comfort level  5/17/2025 0821 by Ivan Pratt RN  Outcome: Progressing  5/17/2025 0744 by Marya Shea RN  Outcome: Progressing     Problem: Skin/Tissue Integrity  Goal: Skin integrity remains intact  Description: 1.  Monitor for areas of redness and/or skin breakdown2.  Assess vascular access sites hourly3.  Every 4-6 hours minimum:  Change oxygen saturation probe site4.  Every 4-6 hours:  If on nasal continuous positive airway pressure, respiratory therapy assess nares and determine need for appliance change or resting period  Outcome: Progressing

## 2025-05-17 NOTE — PROGRESS NOTES
RT Inhaler-Nebulizer Bronchodilator Protocol Note    There is a bronchodilator order in the chart from a provider indicating to follow the RT Bronchodilator Protocol and there is an “Initiate RT Inhaler-Nebulizer Bronchodilator Protocol” order as well (see protocol at bottom of note).    CXR Findings:  No results found.    The findings from the last RT Protocol Assessment were as follows:   History Pulmonary Disease: Chronic pulmonary disease  Respiratory Pattern: Dyspnea on exertion or RR 21-25 bpm  Breath Sounds: Slightly diminished and/or crackles  Cough: Strong, spontaneous, non-productive  Indication for Bronchodilator Therapy: Decreased or absent breath sounds  Bronchodilator Assessment Score: 6    Aerosolized bronchodilator medication orders have been revised according to the RT Inhaler-Nebulizer Bronchodilator Protocol below.    Respiratory Therapist to perform RT Therapy Protocol Assessment initially then follow the protocol.  Repeat RT Therapy Protocol Assessment PRN for score 0-3 or on second treatment, BID, and PRN for scores above 3.    No Indications - adjust the frequency to every 6 hours PRN wheezing or bronchospasm, if no treatments needed after 48 hours then discontinue using Per Protocol order mode.     If indication present, adjust the RT bronchodilator orders based on the Bronchodilator Assessment Score as indicated below.  Use Inhaler orders unless patient has one or more of the following: on home nebulizer, not able to hold breath for 10 seconds, is not alert and oriented, cannot activate and use MDI correctly, or respiratory rate 25 breaths per minute or more, then use the equivalent nebulizer order(s) with same Frequency and PRN reasons based on the score.  If a patient is on this medication at home then do not decrease Frequency below that used at home.    0-3 - enter or revise RT bronchodilator order(s) to equivalent RT Bronchodilator order with Frequency of every 4 hours PRN for wheezing  or increased work of breathing using Per Protocol order mode.        4-6 - enter or revise RT Bronchodilator order(s) to two equivalent RT bronchodilator orders with one order with BID Frequency and one order with Frequency of every 4 hours PRN wheezing or increased work of breathing using Per Protocol order mode.        7-10 - enter or revise RT Bronchodilator order(s) to two equivalent RT bronchodilator orders with one order with TID Frequency and one order with Frequency of every 4 hours PRN wheezing or increased work of breathing using Per Protocol order mode.       11-13 - enter or revise RT Bronchodilator order(s) to one equivalent RT bronchodilator order with QID Frequency and an Albuterol order with Frequency of every 4 hours PRN wheezing or increased work of breathing using Per Protocol order mode.      Greater than 13 - enter or revise RT Bronchodilator order(s) to one equivalent RT bronchodilator order with every 4 hours Frequency and an Albuterol order with Frequency of every 2 hours PRN wheezing or increased work of breathing using Per Protocol order mode.     RT to enter RT Home Evaluation for COPD & MDI Assessment order using Per Protocol order mode.    Electronically signed by Svetlana Beebe RCP on 5/17/2025 at 8:35 AM

## 2025-05-17 NOTE — FLOWSHEET NOTE
Admission assessment complete. See doc flow. A/O x4. From home with . Patient presented to ER c/o SOB and racing heart x4 days, since Tuesday (5/13). Tuesday she was at her Drs office and she reports the Dr stopped her Metoprolol that she has been taking for 10 yrs and switched her meds, she cannot recall the med. Pts  is going to bring her pills bottles in the morning. Pt reports the SOB is worse with exertion. Denies pain and nausea at this time. Pt with a steady gate, independent w/ ambulation. Bathed w/ CHG wipes. Gown, pants and socks applied. No other needs noted at this time. Call light and bedside table within easy reach.    05/17/25 0320   Vital Signs   Temp 97.9 °F (36.6 °C)   Temp Source Oral   Pulse (!) 112   Heart Rate Source Monitor   Respirations 18   BP (!) 155/80   MAP (Calculated) 105   BP Location Left upper arm   BP Method Automatic   Patient Position Semi fowlers   Oxygen Therapy   SpO2 97 %   O2 Device Nasal cannula   Height and Weight   Height 1.6 m (5' 3\")   Weight - Scale 83.3 kg (183 lb 10.3 oz)   Weight Method Actual;Bed scale   BSA (Calculated - sq m) 1.92 sq meters   BMI (Calculated) 32.6

## 2025-05-17 NOTE — H&P
Hospital Medicine History & Physical      Date of Admission: 5/16/2025    Date of Service:  Pt seen/examined on 5/17/2025    [x]Admitted to Inpatient with expected LOS greater than two midnights due to medical therapy.  []Placed in Observation status.    Chief Admission Complaint: Shortness of breath    Presenting Admission History:      80 y.o. female who presented to [facility] with shortness of breath, tachycardia.  PMHx significant for GERD, hypertension, depression, osteoarthritis of right knee.      Patient reports that she has been on metoprolol succinate for over 10 years now and was recently taken off and on Tuesday.  She took her last dose on Tuesday and then on Wednesday is when her symptoms started.  She was outside and noted that she was having chest palpitations, discomfort and shortness of breath even with walking short distances.  She says this is never happened to her before in the past.  She was changed to a new medication but she was unable to recall the name of the new medication that her metoprolol was replaced with.  On review of outside records, it appears that patient was taken off of her metoprolol to see if this helps her asthma and she was added on terazosin for hypertension.  Patient denied any fevers or chills.  Denies any recent illnesses, sick contacts.  Denies any previous cardiac issues.  No other acute complaints.    In the ED, patient was noted to be tachycardic to 102-125.  Blood pressure was stable.  On room air.  BMP largely unremarkable except for a mild increase in creatinine to 1.3 and glucose 247.  Patient received fluids and BMP improving creatinine of 1.0, heart rate improved.  CBC unremarkable.  CTPA with no acute evidence of PE, showed dilated main pulmonary artery measuring 4.1 cm.  EKG showing sinus tachycardia.    Point-of-care echo was done by ED physician and looked to be largely unremarkable.    Assessment/Plan:      Current Principal Problem:     CL 94* 100   CO2 22 23   BUN 27* 26*   CREATININE 1.3* 1.0   CALCIUM 9.1 8.5   MG 2.04  --      Recent Labs     05/16/25  1802 05/16/25  2107   TROPHS 50* 46*     No results for input(s): \"LABA1C\" in the last 72 hours.  Recent Labs     05/16/25  1802   AST 28   ALT 23   BILITOT 0.4   ALKPHOS 120     No results for input(s): \"INR\", \"LACTA\", \"TSH\" in the last 72 hours.     All Saha MD

## 2025-05-17 NOTE — CARE COORDINATION
Case Management Assessment  Initial Evaluation    Date/Time of Evaluation: 5/17/2025 9:19 AM  Assessment Completed by: Lis Rahman    If patient is discharged prior to next notation, then this note serves as note for discharge by case management.    Patient Name: Becky Garay                   YOB: 1945  Diagnosis: Tachycardia [R00.0]  Elevated troponin [R79.89]  CORNELIO (acute kidney injury) [N17.9]  Dyspnea, unspecified type [R06.00]                   Date / Time: 5/16/2025  5:57 PM    Patient Admission Status: Inpatient   Readmission Risk (Low < 19, Mod (19-27), High > 27): Readmission Risk Score: 7.3    Current PCP: Nicolas Denton MD  PCP verified by CM? Yes (Corrie)    Chart Reviewed: Yes      History Provided by: Patient  Patient Orientation: Alert and Oriented    Patient Cognition: Alert    Hospitalization in the last 30 days (Readmission):  No    If yes, Readmission Assessment in CM Navigator will be completed.    Advance Directives:      Code Status: Full Code   Patient's Primary Decision Maker is: Patient Declined (Legal Next of Kin Remains as Decision Maker)      Discharge Planning:    Patient lives with: Spouse/Significant Other Type of Home: House  Primary Care Giver: Self  Patient Support Systems include: Spouse/Significant Other   Current Financial resources: Medicare  Current community resources: None  Current services prior to admission: None            Current DME:              Type of Home Care services:  None    ADLS  Prior functional level: Independent in ADLs/IADLs  Current functional level: Independent in ADLs/IADLs    PT AM-PAC:   /24  OT AM-PAC:   /24    Family can provide assistance at DC: Yes  Would you like Case Management to discuss the discharge plan with any other family members/significant others, and if so, who? No  Plans to Return to Present Housing:    Other Identified Issues/Barriers to RETURNING to current housing: none  Potential Assistance needed at discharge:  N/A            Potential DME:    Patient expects to discharge to: House  Plan for transportation at discharge:      Financial    Payor: [employer] MEDICARE / Plan: [employer] MEDICARE-ADVANTAGE PPO / Product Type: Medicare /     Does insurance require precert for SNF: Yes    Potential assistance Purchasing Medications: No  Meds-to-Beds request:        [id number] - [place], [place] - [address] [place] [phone] - F [phone]  [address]   [place] [facility] [place] [place] [address]  Phone: [phone] Fax: [phone]      Notes:    Factors facilitating achievement of predicted outcomes: Motivated, Cooperative, and Pleasant    Barriers to discharge: cardiology recs    Additional Case Management Notes: Reviewed chart and met with pt at beside. From house with , plan to return at DC. IPTA.  + . No DME or HC PTA. No needs identified at this time. CM following.       The Plan for Transition of Care is related to the following treatment goals of Tachycardia [R00.0]  Elevated troponin [R79.89]  CORNELIO (acute kidney injury) [N17.9]  Dyspnea, unspecified type [R06.00]    IF APPLICABLE: The Patient and/or patient representative Becky and her family were provided with a choice of provider and agrees with the discharge plan. Freedom of choice list with basic dialogue that supports the patient's individualized plan of care/goals and shares the quality data associated with the providers was provided to:     Patient Representative Name:       The Patient and/or Patient Representative Agree with the Discharge Plan?      Lis Rahman  Case Management Department  Ph: [phone] Fax: [phone]

## 2025-05-18 VITALS
DIASTOLIC BLOOD PRESSURE: 76 MMHG | RESPIRATION RATE: 18 BRPM | HEART RATE: 74 BPM | TEMPERATURE: 97.1 F | WEIGHT: 183.64 LBS | OXYGEN SATURATION: 96 % | SYSTOLIC BLOOD PRESSURE: 141 MMHG | HEIGHT: 63 IN | BODY MASS INDEX: 32.54 KG/M2

## 2025-05-18 PROBLEM — N17.9 AKI (ACUTE KIDNEY INJURY): Status: ACTIVE | Noted: 2025-05-18

## 2025-05-18 LAB
ANION GAP SERPL CALCULATED.3IONS-SCNC: 10 MMOL/L (ref 3–16)
BASOPHILS # BLD: 0.1 K/UL (ref 0–0.2)
BASOPHILS NFR BLD: 0.6 %
BUN SERPL-MCNC: 16 MG/DL (ref 7–20)
CALCIUM SERPL-MCNC: 8.8 MG/DL (ref 8.3–10.6)
CHLORIDE SERPL-SCNC: 103 MMOL/L (ref 99–110)
CO2 SERPL-SCNC: 26 MMOL/L (ref 21–32)
CREAT SERPL-MCNC: 0.8 MG/DL (ref 0.6–1.2)
D-DIMER QUANTITATIVE: 0.28 UG/ML FEU (ref 0–0.6)
DEPRECATED RDW RBC AUTO: 13.4 % (ref 12.4–15.4)
EOSINOPHIL # BLD: 0 K/UL (ref 0–0.6)
EOSINOPHIL NFR BLD: 0.6 %
GFR SERPLBLD CREATININE-BSD FMLA CKD-EPI: 73 ML/MIN/{1.73_M2}
GLUCOSE SERPL-MCNC: 103 MG/DL (ref 70–99)
HCT VFR BLD AUTO: 39.2 % (ref 36–48)
HGB BLD-MCNC: 13.5 G/DL (ref 12–16)
LYMPHOCYTES # BLD: 1.8 K/UL (ref 1–5.1)
LYMPHOCYTES NFR BLD: 21.7 %
MCH RBC QN AUTO: 30 PG (ref 26–34)
MCHC RBC AUTO-ENTMCNC: 34.6 G/DL (ref 31–36)
MCV RBC AUTO: 86.7 FL (ref 80–100)
MONOCYTES # BLD: 0.7 K/UL (ref 0–1.3)
MONOCYTES NFR BLD: 9 %
NEUTROPHILS # BLD: 5.6 K/UL (ref 1.7–7.7)
NEUTROPHILS NFR BLD: 68.1 %
PLATELET # BLD AUTO: 194 K/UL (ref 135–450)
PMV BLD AUTO: 8.5 FL (ref 5–10.5)
POTASSIUM SERPL-SCNC: 4.3 MMOL/L (ref 3.5–5.1)
RBC # BLD AUTO: 4.52 M/UL (ref 4–5.2)
SODIUM SERPL-SCNC: 139 MMOL/L (ref 136–145)
WBC # BLD AUTO: 8.3 K/UL (ref 4–11)

## 2025-05-18 PROCEDURE — 2500000003 HC RX 250 WO HCPCS: Performed by: STUDENT IN AN ORGANIZED HEALTH CARE EDUCATION/TRAINING PROGRAM

## 2025-05-18 PROCEDURE — 85379 FIBRIN DEGRADATION QUANT: CPT

## 2025-05-18 PROCEDURE — 94640 AIRWAY INHALATION TREATMENT: CPT

## 2025-05-18 PROCEDURE — 36415 COLL VENOUS BLD VENIPUNCTURE: CPT

## 2025-05-18 PROCEDURE — 6370000000 HC RX 637 (ALT 250 FOR IP): Performed by: INTERNAL MEDICINE

## 2025-05-18 PROCEDURE — 99238 HOSP IP/OBS DSCHRG MGMT 30/<: CPT | Performed by: INTERNAL MEDICINE

## 2025-05-18 PROCEDURE — 94761 N-INVAS EAR/PLS OXIMETRY MLT: CPT

## 2025-05-18 PROCEDURE — 6360000002 HC RX W HCPCS: Performed by: STUDENT IN AN ORGANIZED HEALTH CARE EDUCATION/TRAINING PROGRAM

## 2025-05-18 PROCEDURE — 6370000000 HC RX 637 (ALT 250 FOR IP): Performed by: STUDENT IN AN ORGANIZED HEALTH CARE EDUCATION/TRAINING PROGRAM

## 2025-05-18 PROCEDURE — 80048 BASIC METABOLIC PNL TOTAL CA: CPT

## 2025-05-18 PROCEDURE — 85025 COMPLETE CBC W/AUTO DIFF WBC: CPT

## 2025-05-18 RX ORDER — AMLODIPINE BESYLATE 5 MG/1
5 TABLET ORAL DAILY
Qty: 30 TABLET | Refills: 0 | Status: SHIPPED | OUTPATIENT
Start: 2025-05-18

## 2025-05-18 RX ORDER — AMLODIPINE BESYLATE 5 MG/1
5 TABLET ORAL DAILY
Status: DISCONTINUED | OUTPATIENT
Start: 2025-05-18 | End: 2025-05-18 | Stop reason: HOSPADM

## 2025-05-18 RX ADMIN — METOPROLOL SUCCINATE 50 MG: 50 TABLET, EXTENDED RELEASE ORAL at 08:11

## 2025-05-18 RX ADMIN — TRIAMTERENE AND HYDROCHLOROTHIAZIDE 1 TABLET: 37.5; 25 TABLET ORAL at 08:11

## 2025-05-18 RX ADMIN — DULOXETINE 60 MG: 60 CAPSULE, DELAYED RELEASE ORAL at 08:11

## 2025-05-18 RX ADMIN — SODIUM CHLORIDE, PRESERVATIVE FREE 10 ML: 5 INJECTION INTRAVENOUS at 08:12

## 2025-05-18 RX ADMIN — PANTOPRAZOLE SODIUM 40 MG: 40 TABLET, DELAYED RELEASE ORAL at 06:59

## 2025-05-18 RX ADMIN — ASPIRIN 81 MG: 81 TABLET, COATED ORAL at 08:11

## 2025-05-18 RX ADMIN — AMLODIPINE BESYLATE 5 MG: 5 TABLET ORAL at 11:11

## 2025-05-18 RX ADMIN — ALBUTEROL SULFATE 2 PUFF: 90 AEROSOL, METERED RESPIRATORY (INHALATION) at 07:59

## 2025-05-18 RX ADMIN — ENOXAPARIN SODIUM 40 MG: 100 INJECTION SUBCUTANEOUS at 08:11

## 2025-05-18 NOTE — PLAN OF CARE
Problem: Discharge Planning  Goal: Discharge to home or other facility with appropriate resources  5/18/2025 1104 by Marya Shea RN  Outcome: Adequate for Discharge  5/18/2025 0754 by Marya Shea RN  Outcome: Progressing  5/17/2025 2150 by Ivan Pratt RN  Outcome: Progressing     Problem: Safety - Adult  Goal: Free from fall injury  5/18/2025 1104 by Marya Shea RN  Outcome: Adequate for Discharge  5/18/2025 0754 by Marya Shea RN  Outcome: Progressing  5/17/2025 2150 by Ivan Pratt RN  Outcome: Progressing     Problem: Pain  Goal: Verbalizes/displays adequate comfort level or baseline comfort level  5/18/2025 1104 by Marya Shea RN  Outcome: Adequate for Discharge  5/18/2025 0754 by Marya Shea RN  Outcome: Progressing  5/17/2025 2150 by Ivan Pratt RN  Outcome: Progressing     Problem: Skin/Tissue Integrity  Goal: Skin integrity remains intact  Description: 1.  Monitor for areas of redness and/or skin breakdown2.  Assess vascular access sites hourly3.  Every 4-6 hours minimum:  Change oxygen saturation probe site4.  Every 4-6 hours:  If on nasal continuous positive airway pressure, respiratory therapy assess nares and determine need for appliance change or resting period  5/18/2025 1104 by Marya Shea RN  Outcome: Adequate for Discharge  5/18/2025 0754 by Marya Shea RN  Outcome: Progressing  5/17/2025 2150 by Ivan Pratt RN  Outcome: Progressing

## 2025-05-18 NOTE — PLAN OF CARE
Problem: Safety - Adult  Goal: Free from fall injury  5/18/2025 0754 by Marya Shea RN  Outcome: Progressing  5/17/2025 2150 by Ivan Pratt, RN  Outcome: Progressing     Problem: Skin/Tissue Integrity  Goal: Skin integrity remains intact  Description: 1.  Monitor for areas of redness and/or skin breakdown2.  Assess vascular access sites hourly3.  Every 4-6 hours minimum:  Change oxygen saturation probe site4.  Every 4-6 hours:  If on nasal continuous positive airway pressure, respiratory therapy assess nares and determine need for appliance change or resting period  5/18/2025 0754 by Marya Shea, RN  Outcome: Progressing  5/17/2025 2150 by Ivan Pratt, RN  Outcome: Progressing

## 2025-05-18 NOTE — FLOWSHEET NOTE
05/18/25 0806   Vital Signs   Temp 99 °F (37.2 °C)   Temp Source Oral   Pulse 73   Heart Rate Source Monitor   Respirations 18   BP (!) 161/90   MAP (Calculated) 114   BP Location Left upper arm   BP Method Automatic   Patient Position Sitting   Pain Assessment   Pain Assessment None - Denies Pain   Oxygen Therapy   SpO2 97 %   O2 Device None (Room air)     AM assessment complete. Gave am meds due see mar. A/O x 4. Denies pain. Up as tolerated independent. SOB with exertion noted. Pt states SOB improved from yesterday. Ice water/coffee given. Tray table cleaned. Bed locked/lowest position. Call light within reach.

## 2025-05-18 NOTE — DISCHARGE SUMMARY
Name:  Becky Garay  Room:  0221/0221-02  MRN:    [id number]    Discharge Summary      This discharge summary is in conjunction with a complete physical exam done on the day of discharge.      Discharging Physician: JEANNE HERNÁNDEZ MD      Admit: 5/16/2025  Discharge:  5/18/2025     Diagnoses this Admission    Principal Problem:    Tachycardia  Resolved Problems:    * No resolved hospital problems. *          Procedures (Please Review Full Report for Details)      Consults    IP CONSULT TO CARDIOLOGY      HPI:    80 y.o. female who presented to [facility] with shortness of breath, tachycardia.  PMHx significant for GERD, hypertension, depression, osteoarthritis of right knee.       Patient reports that she has been on metoprolol succinate for over 10 years now and was recently taken off and on Tuesday.  She took her last dose on Tuesday and then on Wednesday is when her symptoms started.  She was outside and noted that she was having chest palpitations, discomfort and shortness of breath even with walking short distances.  She says this is never happened to her before in the past.  She was changed to a new medication but she was unable to recall the name of the new medication that her metoprolol was replaced with.  On review of outside records, it appears that patient was taken off of her metoprolol to see if this helps her asthma and she was added on terazosin for hypertension.  Patient denied any fevers or chills.  Denies any recent illnesses, sick contacts.  Denies any previous cardiac issues.  No other acute complaints.         Physical Exam at Discharge:  BP (!) 161/90   Pulse 73   Temp 99 °F (37.2 °C) (Oral)   Resp 18   Ht 1.6 m (5' 3\")   Wt 83.3 kg (183 lb 10.3 oz)   SpO2 97%   BMI 32.53 kg/m²   Appearance: Normal appearance. She is not ill-appearing.   HENT:      Nose: No congestion.   Cardiovascular:      Rate and Rhythm: Regular rhythm.       Pulses: Normal pulses.      Heart      CONTINUE taking these medications      albuterol sulfate  (90 Base) MCG/ACT inhaler  Commonly known as: PROVENTIL;VENTOLIN;PROAIR     atorvastatin 40 MG tablet  Commonly known as: LIPITOR  Take 1 tablet by mouth nightly     DULoxetine 60 MG extended release capsule  Commonly known as: CYMBALTA     metoprolol succinate 50 MG extended release tablet  Commonly known as: TOPROL XL     omeprazole 20 MG delayed release capsule  Commonly known as: PRILOSEC     terazosin 2 MG capsule  Commonly known as: HYTRIN     triamterene-hydroCHLOROthiazide 37.5-25 MG per capsule  Commonly known as: DYAZIDE            STOP taking these medications      aspirin 81 MG EC tablet     therapeutic multivitamin-minerals tablet               Where to Get Your Medications        These medications were sent to [id number] - [place], [place] - [address] - P [phone] - F [phone]  [address] [place] [address] [address] [facility] [address]      Phone: [phone]   amLODIPine 5 MG tablet           Discharge Condition/Location: Stable    Follow Up:  Follow up with PCP.        JEANNE HERNÁNDEZ MD 5/18/2025 9:52 AM

## 2025-05-18 NOTE — PLAN OF CARE
Problem: Discharge Planning  Goal: Discharge to home or other facility with appropriate resources  5/17/2025 2150 by Ivan Pratt RN  Outcome: Progressing  5/17/2025 0821 by Ivan Pratt RN  Outcome: Progressing     Problem: Safety - Adult  Goal: Free from fall injury  5/17/2025 2150 by Ivan Pratt RN  Outcome: Progressing  5/17/2025 0821 by Ivan Pratt RN  Outcome: Progressing     Problem: Pain  Goal: Verbalizes/displays adequate comfort level or baseline comfort level  5/17/2025 2150 by Ivan Pratt RN  Outcome: Progressing  5/17/2025 0821 by Ivan Pratt RN  Outcome: Progressing     Problem: Skin/Tissue Integrity  Goal: Skin integrity remains intact  Description: 1.  Monitor for areas of redness and/or skin breakdown2.  Assess vascular access sites hourly3.  Every 4-6 hours minimum:  Change oxygen saturation probe site4.  Every 4-6 hours:  If on nasal continuous positive airway pressure, respiratory therapy assess nares and determine need for appliance change or resting period  5/17/2025 2150 by Ivan Pratt RN  Outcome: Progressing  5/17/2025 0821 by Ivan Pratt RN  Outcome: Progressing

## 2025-05-18 NOTE — FLOWSHEET NOTE
05/18/25 1247   Vital Signs   Temp 97.1 °F (36.2 °C)   Temp Source Oral   Pulse 74   Heart Rate Source Monitor   Respirations 18   BP (!) 141/76   MAP (Calculated) 98   BP Location Left upper arm   BP Method Automatic   Patient Position Sitting   Pain Assessment   Pain Assessment None - Denies Pain   Oxygen Therapy   SpO2 96 %   O2 Device None (Room air)     Prescription: Norvasc [facility] and discharge instructions given. Pt verbalized understanding denies any questions/ needs at this time. Transport called to transport pt to vehicle for discharge home

## 2025-05-18 NOTE — FLOWSHEET NOTE
Shift assessment complete. See doc flow. Nightly medications given see MAR. A/O x4. Patient reports improvement with breathing, decreased SOB at rest and with ambulation. HR improved. Cardio following, med adjustment. Patient denies pain at this time. Swelling decreased to RLE. Pt ambulates independently. Nightly snack given. No other needs noted at this time. Call light and bedside table within easy reach.    05/17/25 2132   Vital Signs   Temp 98.4 °F (36.9 °C)   Temp Source Oral   Pulse 72   Heart Rate Source Monitor   Respirations 16   BP (!) 147/80   MAP (Calculated) 102   BP Location Left upper arm   BP Method Automatic   Patient Position Semi fowlers   Oxygen Therapy   SpO2 95 %   O2 Device None (Room air)

## 2025-05-24 NOTE — PROGRESS NOTES
Physician Progress Note      PATIENT:               BERNADETTE HAZEL  [facility] #:                  [id number]  :                       1945  ADMIT DATE:       2025 5:57 PM  DISCH DATE:        2025 1:05 PM  RESPONDING  PROVIDER #:        All Moore MD          QUERY TEXT:    Acute Kidney Injury is documented in the medical record ED Provider Notes by   All Moore MD at 2025. Please provide additional clinical indicators   supportive of your documentation. Or please document if the diagnosis of CORNELIO   has been ruled out after study.    The clinical indicators include:  -\"Acute kidney injury will provide IV fluids renal panel otherwise benign.\"(ED   Provider Notes by All Moore MD at 2025)  -Creatinine 1.3, 1.0, 0.9, 0.8 (EPIC lab result 25-25)  -\"Creatinine 1.3 (baseline 0.9) on admission\"(Cardiology Consults by Archie Pace MD at 2025)  Options provided:  -- Acute kidney injury evidenced by, Please document evidence.  -- Acute kidney injury ruled out after study  -- Other - I will add my own diagnosis  -- Disagree - Not applicable / Not valid  -- Disagree - Clinically unable to determine / Unknown  -- Refer to Clinical Documentation Reviewer    PROVIDER RESPONSE TEXT:    This patient has an acute kidney injury as evidenced by Creatinine increased   to 1.3 from baseline 0.9    Query created by: Lis Rice on 2025 7:47 PM      Electronically signed by:  All Moore MD 2025 6:59 AM

## 2025-05-25 NOTE — PROGRESS NOTES
Physician Progress Note      PATIENT:               BERNADETTE HAZEL  CSN #:                  [id number]  :                       1945  ADMIT DATE:       2025 5:57 PM  DISCH DATE:        2025 1:05 PM  RESPONDING  PROVIDER #:        Michael Cueto MD          QUERY TEXT:    Elevated cardiac troponin (cTc) levels are documented in the medical record   Discharge Summary by Michael Cueto MD at 2025 and ED   Provider Notes by All Moore MD at 2025. Please clarify the cause:    The clinical indicators include:  -\"Demand ischemia with a mildly increased troponin due to tachycardia\"(ED   Provider Notes by All Moore MD at 2025)  -\"I favor a demand ischemia in the context of tachycardia which is   persistent.\"(ED Provider Notes by All Moore MD at 2025)  -\"She was outside and noted that she was having chest palpitations, discomfort   and shortness of breath even with walking short distances\"(H&P by All Saha MD at 2025)  -\"Initial troponins were elevated at 50 but downtrending\"(Discharge Summary by   Michael Cueto MD at 2025)  -Troponin HS 46, 50(EPIC lab result 25)  Options provided:  -- Elevated cardiac troponin levels only without corresponding diagnosis  -- Myocardial injury, non-ischemic (non-traumatic) related to tachycardia  -- Other - I will add my own diagnosis  -- Disagree - Not applicable / Not valid  -- Disagree - Clinically unable to determine / Unknown  -- Refer to Clinical Documentation Reviewer    PROVIDER RESPONSE TEXT:    This patient has elevated cardiac troponin levels without corresponding   diagnosis.    Query created by: Lis Rice on 2025 8:00 PM      Electronically signed by:  Michael Cueto MD 2025 6:31 PM